# Patient Record
Sex: MALE | Race: AMERICAN INDIAN OR ALASKA NATIVE | NOT HISPANIC OR LATINO
[De-identification: names, ages, dates, MRNs, and addresses within clinical notes are randomized per-mention and may not be internally consistent; named-entity substitution may affect disease eponyms.]

---

## 2022-01-22 ENCOUNTER — TRANSCRIPTION ENCOUNTER (OUTPATIENT)
Age: 52
End: 2022-01-22

## 2022-01-22 ENCOUNTER — INPATIENT (INPATIENT)
Facility: HOSPITAL | Age: 52
LOS: 5 days | Discharge: HOME | End: 2022-01-28
Attending: SURGERY | Admitting: SURGERY
Payer: MEDICAID

## 2022-01-22 VITALS
SYSTOLIC BLOOD PRESSURE: 120 MMHG | RESPIRATION RATE: 20 BRPM | DIASTOLIC BLOOD PRESSURE: 56 MMHG | OXYGEN SATURATION: 99 % | HEART RATE: 65 BPM | TEMPERATURE: 97 F

## 2022-01-22 LAB
ALBUMIN SERPL ELPH-MCNC: 4 G/DL — SIGNIFICANT CHANGE UP (ref 3.5–5.2)
ALP SERPL-CCNC: 226 U/L — HIGH (ref 30–115)
ALT FLD-CCNC: 671 U/L — HIGH (ref 0–41)
ANION GAP SERPL CALC-SCNC: 14 MMOL/L — SIGNIFICANT CHANGE UP (ref 7–14)
APPEARANCE UR: CLEAR — SIGNIFICANT CHANGE UP
APTT BLD: 28.2 SEC — SIGNIFICANT CHANGE UP (ref 27–39.2)
AST SERPL-CCNC: 482 U/L — HIGH (ref 0–41)
BACTERIA # UR AUTO: NEGATIVE — SIGNIFICANT CHANGE UP
BASE EXCESS BLDV CALC-SCNC: -0.9 MMOL/L — SIGNIFICANT CHANGE UP (ref -2–3)
BASE EXCESS BLDV CALC-SCNC: -2.5 MMOL/L — LOW (ref -2–3)
BASOPHILS # BLD AUTO: 0.01 K/UL — SIGNIFICANT CHANGE UP (ref 0–0.2)
BASOPHILS NFR BLD AUTO: 0.1 % — SIGNIFICANT CHANGE UP (ref 0–1)
BILIRUB SERPL-MCNC: 3.5 MG/DL — HIGH (ref 0.2–1.2)
BILIRUB UR-MCNC: ABNORMAL
BUN SERPL-MCNC: 13 MG/DL — SIGNIFICANT CHANGE UP (ref 10–20)
CA-I SERPL-SCNC: 1.03 MMOL/L — LOW (ref 1.15–1.33)
CA-I SERPL-SCNC: 1.15 MMOL/L — SIGNIFICANT CHANGE UP (ref 1.15–1.33)
CALCIUM SERPL-MCNC: 8.2 MG/DL — LOW (ref 8.5–10.1)
CHLORIDE SERPL-SCNC: 99 MMOL/L — SIGNIFICANT CHANGE UP (ref 98–110)
CO2 SERPL-SCNC: 20 MMOL/L — SIGNIFICANT CHANGE UP (ref 17–32)
COLOR SPEC: ABNORMAL
CREAT SERPL-MCNC: 0.9 MG/DL — SIGNIFICANT CHANGE UP (ref 0.7–1.5)
DIFF PNL FLD: ABNORMAL
EOSINOPHIL # BLD AUTO: 0.04 K/UL — SIGNIFICANT CHANGE UP (ref 0–0.7)
EOSINOPHIL NFR BLD AUTO: 0.6 % — SIGNIFICANT CHANGE UP (ref 0–8)
EPI CELLS # UR: 1 /HPF — SIGNIFICANT CHANGE UP (ref 0–5)
GAS PNL BLDV: 129 MMOL/L — LOW (ref 136–145)
GAS PNL BLDV: 130 MMOL/L — LOW (ref 136–145)
GAS PNL BLDV: SIGNIFICANT CHANGE UP
GAS PNL BLDV: SIGNIFICANT CHANGE UP
GLUCOSE SERPL-MCNC: 211 MG/DL — HIGH (ref 70–99)
GLUCOSE UR QL: ABNORMAL
HCO3 BLDV-SCNC: 22 MMOL/L — SIGNIFICANT CHANGE UP (ref 22–29)
HCO3 BLDV-SCNC: 28 MMOL/L — SIGNIFICANT CHANGE UP (ref 22–29)
HCT VFR BLD CALC: 44.7 % — SIGNIFICANT CHANGE UP (ref 42–52)
HCT VFR BLDA CALC: 35 % — LOW (ref 39–51)
HCT VFR BLDA CALC: 45 % — SIGNIFICANT CHANGE UP (ref 39–51)
HGB BLD CALC-MCNC: 11.7 G/DL — LOW (ref 12.6–17.4)
HGB BLD CALC-MCNC: 15.1 G/DL — SIGNIFICANT CHANGE UP (ref 12.6–17.4)
HGB BLD-MCNC: 14.8 G/DL — SIGNIFICANT CHANGE UP (ref 14–18)
HYALINE CASTS # UR AUTO: 1 /LPF — SIGNIFICANT CHANGE UP (ref 0–7)
IMM GRANULOCYTES NFR BLD AUTO: 0.4 % — HIGH (ref 0.1–0.3)
INR BLD: 1.04 RATIO — SIGNIFICANT CHANGE UP (ref 0.65–1.3)
KETONES UR-MCNC: ABNORMAL
LACTATE BLDV-MCNC: 1.3 MMOL/L — SIGNIFICANT CHANGE UP (ref 0.5–2)
LACTATE BLDV-MCNC: 5.5 MMOL/L — CRITICAL HIGH (ref 0.5–2)
LEUKOCYTE ESTERASE UR-ACNC: NEGATIVE — SIGNIFICANT CHANGE UP
LYMPHOCYTES # BLD AUTO: 0.86 K/UL — LOW (ref 1.2–3.4)
LYMPHOCYTES # BLD AUTO: 12.8 % — LOW (ref 20.5–51.1)
MCHC RBC-ENTMCNC: 30.3 PG — SIGNIFICANT CHANGE UP (ref 27–31)
MCHC RBC-ENTMCNC: 33.1 G/DL — SIGNIFICANT CHANGE UP (ref 32–37)
MCV RBC AUTO: 91.4 FL — SIGNIFICANT CHANGE UP (ref 80–94)
MONOCYTES # BLD AUTO: 0.36 K/UL — SIGNIFICANT CHANGE UP (ref 0.1–0.6)
MONOCYTES NFR BLD AUTO: 5.4 % — SIGNIFICANT CHANGE UP (ref 1.7–9.3)
NEUTROPHILS # BLD AUTO: 5.41 K/UL — SIGNIFICANT CHANGE UP (ref 1.4–6.5)
NEUTROPHILS NFR BLD AUTO: 80.7 % — HIGH (ref 42.2–75.2)
NITRITE UR-MCNC: NEGATIVE — SIGNIFICANT CHANGE UP
NRBC # BLD: 0 /100 WBCS — SIGNIFICANT CHANGE UP (ref 0–0)
NT-PROBNP SERPL-SCNC: 291 PG/ML — SIGNIFICANT CHANGE UP (ref 0–300)
PCO2 BLDV: 34 MMHG — LOW (ref 42–55)
PCO2 BLDV: 64 MMHG — HIGH (ref 42–55)
PH BLDV: 7.25 — LOW (ref 7.32–7.43)
PH BLDV: 7.41 — SIGNIFICANT CHANGE UP (ref 7.32–7.43)
PH UR: 7.5 — SIGNIFICANT CHANGE UP (ref 5–8)
PLATELET # BLD AUTO: 170 K/UL — SIGNIFICANT CHANGE UP (ref 130–400)
PO2 BLDV: 17 MMHG — SIGNIFICANT CHANGE UP
PO2 BLDV: 74 MMHG — SIGNIFICANT CHANGE UP
POTASSIUM BLDV-SCNC: 3.1 MMOL/L — LOW (ref 3.5–5.1)
POTASSIUM BLDV-SCNC: 6.8 MMOL/L — CRITICAL HIGH (ref 3.5–5.1)
POTASSIUM SERPL-MCNC: 4.6 MMOL/L — SIGNIFICANT CHANGE UP (ref 3.5–5)
POTASSIUM SERPL-SCNC: 4.6 MMOL/L — SIGNIFICANT CHANGE UP (ref 3.5–5)
PROT SERPL-MCNC: 6.6 G/DL — SIGNIFICANT CHANGE UP (ref 6–8)
PROT UR-MCNC: ABNORMAL
PROTHROM AB SERPL-ACNC: 12 SEC — SIGNIFICANT CHANGE UP (ref 9.95–12.87)
RAPID RVP RESULT: SIGNIFICANT CHANGE UP
RBC # BLD: 4.89 M/UL — SIGNIFICANT CHANGE UP (ref 4.7–6.1)
RBC # FLD: 13.2 % — SIGNIFICANT CHANGE UP (ref 11.5–14.5)
RBC CASTS # UR COMP ASSIST: 1 /HPF — SIGNIFICANT CHANGE UP (ref 0–4)
SAO2 % BLDV: 26 % — SIGNIFICANT CHANGE UP
SAO2 % BLDV: 95.5 % — SIGNIFICANT CHANGE UP
SARS-COV-2 RNA SPEC QL NAA+PROBE: SIGNIFICANT CHANGE UP
SODIUM SERPL-SCNC: 133 MMOL/L — LOW (ref 135–146)
SP GR SPEC: >1.05 (ref 1.01–1.03)
TROPONIN T SERPL-MCNC: <0.01 NG/ML — SIGNIFICANT CHANGE UP
UROBILINOGEN FLD QL: ABNORMAL
WBC # BLD: 6.71 K/UL — SIGNIFICANT CHANGE UP (ref 4.8–10.8)
WBC # FLD AUTO: 6.71 K/UL — SIGNIFICANT CHANGE UP (ref 4.8–10.8)
WBC UR QL: 6 /HPF — HIGH (ref 0–5)

## 2022-01-22 PROCEDURE — 71045 X-RAY EXAM CHEST 1 VIEW: CPT | Mod: 26

## 2022-01-22 PROCEDURE — 74177 CT ABD & PELVIS W/CONTRAST: CPT | Mod: 26,MA

## 2022-01-22 PROCEDURE — 93010 ELECTROCARDIOGRAM REPORT: CPT

## 2022-01-22 PROCEDURE — 99291 CRITICAL CARE FIRST HOUR: CPT

## 2022-01-22 RX ORDER — KETOROLAC TROMETHAMINE 30 MG/ML
15 SYRINGE (ML) INJECTION ONCE
Refills: 0 | Status: DISCONTINUED | OUTPATIENT
Start: 2022-01-22 | End: 2022-01-22

## 2022-01-22 RX ORDER — MORPHINE SULFATE 50 MG/1
2 CAPSULE, EXTENDED RELEASE ORAL EVERY 6 HOURS
Refills: 0 | Status: DISCONTINUED | OUTPATIENT
Start: 2022-01-22 | End: 2022-01-27

## 2022-01-22 RX ORDER — DEXTROSE 50 % IN WATER 50 %
15 SYRINGE (ML) INTRAVENOUS ONCE
Refills: 0 | Status: DISCONTINUED | OUTPATIENT
Start: 2022-01-22 | End: 2022-01-27

## 2022-01-22 RX ORDER — INSULIN GLARGINE 100 [IU]/ML
10 INJECTION, SOLUTION SUBCUTANEOUS AT BEDTIME
Refills: 0 | Status: DISCONTINUED | OUTPATIENT
Start: 2022-01-22 | End: 2022-01-22

## 2022-01-22 RX ORDER — PIPERACILLIN AND TAZOBACTAM 4; .5 G/20ML; G/20ML
3.38 INJECTION, POWDER, LYOPHILIZED, FOR SOLUTION INTRAVENOUS EVERY 8 HOURS
Refills: 0 | Status: DISCONTINUED | OUTPATIENT
Start: 2022-01-22 | End: 2022-01-23

## 2022-01-22 RX ORDER — PIPERACILLIN AND TAZOBACTAM 4; .5 G/20ML; G/20ML
3.38 INJECTION, POWDER, LYOPHILIZED, FOR SOLUTION INTRAVENOUS ONCE
Refills: 0 | Status: COMPLETED | OUTPATIENT
Start: 2022-01-22 | End: 2022-01-22

## 2022-01-22 RX ORDER — ENOXAPARIN SODIUM 100 MG/ML
40 INJECTION SUBCUTANEOUS AT BEDTIME
Refills: 0 | Status: DISCONTINUED | OUTPATIENT
Start: 2022-01-22 | End: 2022-01-27

## 2022-01-22 RX ORDER — DEXTROSE 50 % IN WATER 50 %
12.5 SYRINGE (ML) INTRAVENOUS ONCE
Refills: 0 | Status: DISCONTINUED | OUTPATIENT
Start: 2022-01-22 | End: 2022-01-27

## 2022-01-22 RX ORDER — SODIUM CHLORIDE 9 MG/ML
1000 INJECTION, SOLUTION INTRAVENOUS
Refills: 0 | Status: DISCONTINUED | OUTPATIENT
Start: 2022-01-22 | End: 2022-01-27

## 2022-01-22 RX ORDER — DEXTROSE 50 % IN WATER 50 %
25 SYRINGE (ML) INTRAVENOUS ONCE
Refills: 0 | Status: DISCONTINUED | OUTPATIENT
Start: 2022-01-22 | End: 2022-01-27

## 2022-01-22 RX ORDER — ACETAMINOPHEN 500 MG
650 TABLET ORAL ONCE
Refills: 0 | Status: COMPLETED | OUTPATIENT
Start: 2022-01-22 | End: 2022-01-22

## 2022-01-22 RX ORDER — SODIUM CHLORIDE 9 MG/ML
1000 INJECTION, SOLUTION INTRAVENOUS ONCE
Refills: 0 | Status: COMPLETED | OUTPATIENT
Start: 2022-01-22 | End: 2022-01-22

## 2022-01-22 RX ORDER — PANTOPRAZOLE SODIUM 20 MG/1
40 TABLET, DELAYED RELEASE ORAL
Refills: 0 | Status: DISCONTINUED | OUTPATIENT
Start: 2022-01-22 | End: 2022-01-27

## 2022-01-22 RX ORDER — ONDANSETRON 8 MG/1
4 TABLET, FILM COATED ORAL EVERY 8 HOURS
Refills: 0 | Status: DISCONTINUED | OUTPATIENT
Start: 2022-01-22 | End: 2022-01-27

## 2022-01-22 RX ORDER — CHLORHEXIDINE GLUCONATE 213 G/1000ML
1 SOLUTION TOPICAL EVERY 24 HOURS
Refills: 0 | Status: DISCONTINUED | OUTPATIENT
Start: 2022-01-22 | End: 2022-01-27

## 2022-01-22 RX ORDER — SODIUM CHLORIDE 9 MG/ML
2000 INJECTION, SOLUTION INTRAVENOUS ONCE
Refills: 0 | Status: COMPLETED | OUTPATIENT
Start: 2022-01-22 | End: 2022-01-22

## 2022-01-22 RX ORDER — METRONIDAZOLE 500 MG
500 TABLET ORAL ONCE
Refills: 0 | Status: COMPLETED | OUTPATIENT
Start: 2022-01-22 | End: 2022-01-22

## 2022-01-22 RX ORDER — GLUCAGON INJECTION, SOLUTION 0.5 MG/.1ML
1 INJECTION, SOLUTION SUBCUTANEOUS ONCE
Refills: 0 | Status: DISCONTINUED | OUTPATIENT
Start: 2022-01-22 | End: 2022-01-27

## 2022-01-22 RX ORDER — CEFEPIME 1 G/1
2000 INJECTION, POWDER, FOR SOLUTION INTRAMUSCULAR; INTRAVENOUS ONCE
Refills: 0 | Status: COMPLETED | OUTPATIENT
Start: 2022-01-22 | End: 2022-01-22

## 2022-01-22 RX ADMIN — SODIUM CHLORIDE 2000 MILLILITER(S): 9 INJECTION, SOLUTION INTRAVENOUS at 19:27

## 2022-01-22 RX ADMIN — Medication 650 MILLIGRAM(S): at 19:27

## 2022-01-22 RX ADMIN — PIPERACILLIN AND TAZOBACTAM 200 GRAM(S): 4; .5 INJECTION, POWDER, LYOPHILIZED, FOR SOLUTION INTRAVENOUS at 23:46

## 2022-01-22 RX ADMIN — Medication 15 MILLIGRAM(S): at 22:54

## 2022-01-22 RX ADMIN — CEFEPIME 100 MILLIGRAM(S): 1 INJECTION, POWDER, FOR SOLUTION INTRAMUSCULAR; INTRAVENOUS at 22:15

## 2022-01-22 RX ADMIN — Medication 100 MILLIGRAM(S): at 21:46

## 2022-01-22 RX ADMIN — SODIUM CHLORIDE 100 MILLILITER(S): 9 INJECTION, SOLUTION INTRAVENOUS at 23:26

## 2022-01-22 RX ADMIN — SODIUM CHLORIDE 1000 MILLILITER(S): 9 INJECTION, SOLUTION INTRAVENOUS at 21:46

## 2022-01-22 NOTE — ED PROVIDER NOTE - NS ED ROS FT
Constitutional: (+) fever (-) malaise (-) diaphoresis (-) chills (-) wt. loss (-) body aches (-) night sweats  Eyes: (-) visual changes (-) eye pain (-) eye discharge (-) photophobia (-) FB sensation  ENMT: (-) nasal or chest congestion (-) runny nose (-) sore throat (-) hoarseness  (-) hearing changes (-) ear pain (-) ear discharge or infections (-) neck pain (-) neck stiffness  Cardiac: (-) chest pain  (-) palpitations (-) syncope (-) edema  Respiratory: (+) cough (+) SOB (-) GAMBOA  GI: (-) nausea (-) vomiting (-) diarrhea (-) abdominal pain  Neuro: (-) headache (-) dizziness (-) numbness/tingling to extremities B/L (-) weakness  Skin: (-) rash (-) laceration    Except as documented in the HPI, all other systems are negative.

## 2022-01-22 NOTE — ED PROVIDER NOTE - CLINICAL SUMMARY MEDICAL DECISION MAKING FREE TEXT BOX
sepsis - unclear etiology - transaminitis and jaundice noted - neg CT - admitted for further treatment - possible drug-induced hepatitis 2/2 testosterone supplements

## 2022-01-22 NOTE — H&P ADULT - ATTENDING COMMENTS
51 yr old male presented with c/o abdominal pain.  VITAL SIGNS (Last 24 hrs):  T(C): 35.6 (01-23-22 @ 13:12), Max: 39.2 (01-22-22 @ 19:02)  HR: 53 (01-23-22 @ 13:12) (53 - 149)  BP: 113/70 (01-23-22 @ 13:12) (93/57 - 120/56)  RR: 18 (01-23-22 @ 13:12) (18 - 20)  SpO2: 96% (01-23-22 @ 07:55) (96% - 99%)  Wt(kg): --  Daily     Daily     I&O's Summary    22 Jan 2022 07:01  -  23 Jan 2022 07:00  --------------------------------------------------------  IN: 750 mL / OUT: 800 mL / NET: -50 mL                          14.8   6.71  )-----------( 170      ( 22 Jan 2022 19:01 )             44.7   01-23    137  |  106  |  13  ----------------------------<  203<H>  3.5   |  20  |  0.7    Ca    7.7<L>      23 Jan 2022 04:30  Phos  2.1     01-23  Mg     2.3     01-23    TPro  5.1<L>  /  Alb  3.0<L>  /  TBili  2.4<H>  /  DBili  x   /  AST  277<H>  /  ALT  441<H>  /  AlkPhos  165<H>  01-23  ekg- rate 149/min RBBB  o/e  aaox3, sclera not yellow  chest--b/l air entry  [id number]  abd/gi--soft, bs+  no edema  assessment and plan:  # Ruq pain sec to cholelithiasis but no sonographic evidence of ac cholecystitis-- will get HIDA scan in AM-- GI eval is pending and patient started on diet  # transaminits is improving-- direct bilirubin is trending down  #hx of diabetes-- patient is controlled only on diet-- will check hba1c and finger stick monitoring. urine noted for glycosuria.  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

## 2022-01-22 NOTE — ED PROVIDER NOTE - ATTENDING CONTRIBUTION TO CARE
I am unable to obtain a comprehensive history, review of systems, past medical history, and/or physical exam due to constraints imposed by the urgency of the patient's clinical condition and/or mental status.     Patient is a 51-year-old male who was upgraded from urgent care to critical care for developing rigors.    Exam: tachycardia, thready pulses, no LE edema, no calf tenderness, CTAB, soft NT abdomen, lacy reticular rash  Plan: labs, cx, ua, xr, ivf, abx I am unable to obtain a comprehensive history, review of systems, past medical history, and/or physical exam due to constraints imposed by the urgency of the patient's clinical condition and/or mental status.     Patient is a 51-year-old male who was upgraded from urgent care to critical care for developing rigors, fever, tachycardia, and tachypnea - consistent with sepsis.  No hypotension.    Exam: tachycardia, thready pulses, no LE edema, no calf tenderness, CTAB, soft NT abdomen, lacy reticular rash  Plan: labs, cx, ua, xr, ivf, abx

## 2022-01-22 NOTE — ED ADULT NURSE NOTE - NSIMPLEMENTINTERV_GEN_ALL_ED
Implemented All Fall Risk Interventions:  [place] to call system. Call bell, personal items and telephone within reach. Instruct patient to call for assistance. Room bathroom lighting operational. Non-slip footwear when patient is off stretcher. Physically safe environment: no spills, clutter or unnecessary equipment. Stretcher in lowest position, wheels locked, appropriate side rails in place. Provide visual cue, wrist band, yellow gown, etc. Monitor gait and stability. Monitor for mental status changes and reorient to person, place, and time. Review medications for side effects contributing to fall risk. Reinforce activity limits and safety measures with patient and family.

## 2022-01-22 NOTE — H&P ADULT - REASON FOR ADMISSION
Keep wound clean and dry.    May shower tomorrow, avoid immersive baths.    May resume taking Ibuprofen on 12/21/18.    Do not drive while taking narcotic pain medication.    May alternate Norco with Tylenol, but do not take more than 4 grams of Tylenol in 24 hours.    Avoid tension to suture line such as pulling or stretching.    Wear surgical bra for 2-3 days, adjust if discomfort.    - Call Physician for signs of wound infection:  (1)  Fever greater than 101 degrees F  (2)  Bleeding  (3)  Separation of incision  (4)  Pus like drainage  (5)  Excessive swelling  - For difficulty breathing, vomiting, inability to tolerate oral intake   - For any pain that is not controlled by pain medication or anything worrisome   -Avoid driving while taking pain medications or experiencing pain   -Contact physician's office for post-operative appointment     Patient/Family given For Your Well-Being Teaching Sheet:     Care After Anesthesia/Sedation __xx____   Oral Contraceptive Pills may be ineffective for the next 8 days following anesthesia due to interactions with medications you may have received    Pain Management Tips              __xx____  About Surgical Site Infections  ___xx___  Smoking and Second hand Smoke Information for Everyone xx_____  Same Day Surgery  Card           __xx____    Supplies:        
abdominal pain  fever

## 2022-01-22 NOTE — H&P ADULT - HISTORY OF PRESENT ILLNESS
50 yo male, h/o pre diabetes, not on home medication presented for fever and abdominal pain  Patient started taking 2 testosterone pills per day since last week; Since then he is been having RU and HUSEYIN quadrant pain, away from meals, severe , non radiating, associated with SOB, nausea, headache. Pain is stabbing, relieved by Tylenol, associated with one episode of loose stools, and fever with chills that started 3 days ago  ROS otherwise negative     temp 102.5 108/61 mmHg room air

## 2022-01-22 NOTE — H&P ADULT - ASSESSMENT
50 yo male, h/o pre diabetes, not on home medication presented for fever and abdominal pain    # Suspected Cholangitis   # Sepsis  # SIRS on admission  # Abdominal Pain     - RUQ tenderness on exam   - SIRS  temp 102.5 F; WBC WNL lactate 5.5 on admission   - Total [place] 3.5 Alk Phos 226    - s.p 3 L LR and cefepime  - start LR at 100 cc/hr  - start Zosyn 3.25 q 4 hrs  - CT AP negative for abdominal pathology  - check Liver US  - GI on board for possible ERCP  - Consider Surgery Consult if cholelithiasis for GB removal  - follow up Blood culture; lactate ; daily LFTs  - patient was taking testosterone pills the last week; not sure if it triggers biliary cholic ; no ETOH use, follow up lipid panel     # Pre diabetes    - check A1c  - start basal insulin if FS persistently > 180   - counseled about weight loss, exercise, healthy diet , consistent carb diet    # DVT ppx Lovenox  # GI ppx protonix  # NPO for now  # Full Code

## 2022-01-22 NOTE — ED PROVIDER NOTE - OBJECTIVE STATEMENT
50 yo M no pmhx presenting to the ED for evaluation of sharp, constant, worsening, b/l pleuritic chest pain, fever, sob, and cough x 3 days. Pt admits to recently taking testosterone supplements. Denies any alleviating factors for symptoms. denies LE swelling, calf pain, nausea, vomiting, abd pain, LE swelling, calf pain.

## 2022-01-22 NOTE — H&P ADULT - NSHPPHYSICALEXAM_GEN_ALL_CORE
PHYSICAL EXAM:      Constitutional: NAD    Eye slightly icteric     Respiratory: clear to auscultation b/l     Cardiovascular: regular rate and rhythm no audible murmur     Gastrointestinal: soft non tender, positive bowel sounds no organomegaly     Extremities: no LE edema     Neurological: alert and oriented x 3 grossly intact non focal     Skin: pale

## 2022-01-22 NOTE — PATIENT PROFILE ADULT - FALL HARM RISK - UNIVERSAL INTERVENTIONS
Bed in lowest position, wheels locked, appropriate side rails in place/Call bell, personal items and telephone in reach/Instruct patient to call for assistance before getting out of bed or chair/Non-slip footwear when patient is out of bed/[place] to call system/Physically safe environment - no spills, clutter or unnecessary equipment/Purposeful Proactive Rounding/Room/bathroom lighting operational, light cord in reach

## 2022-01-22 NOTE — ED PROVIDER NOTE - PROGRESS NOTE DETAILS
NC: upon initial evaluation of pt, tachycardic to 150 bpm, tachypneic, pt moved to crit at this time.

## 2022-01-22 NOTE — ED PROVIDER NOTE - PHYSICAL EXAMINATION
GENERAL: Well-nourished, Well-developed. NAD.  HEAD: No visible or palpable bumps or hematomas. No ecchymosis behind ears B/L.  Eyes: PERRLA, EOMI. No asymmetry. No nystagmus. No conjunctival injection. Non-icteric sclera.  ENMT: MMM.   Neck: Supple. FROM  CVS: (+)tachycardic. Normal S1,S2. No murmurs appreciated on auscultation   RESP: (+)tachypneic. Lungs clear to auscultation B/L. No wheezing, rales, or rhonchi auscultated.  GI: Normal auscultation of bowel sounds in all 4 quadrants. Soft, Nontender, Nondistended. No guarding or rebound tenderness. No CVAT B/L.  Skin: Warm, Dry. No rashes or lesions. Good cap refill < 2 sec B/L.  EXT: Radial and pedal pulses present B/L. No calf tenderness or swelling B/L. No palpable cords. No pedal edema B/L.

## 2022-01-23 LAB
ALBUMIN SERPL ELPH-MCNC: 3 G/DL — LOW (ref 3.5–5.2)
ALP SERPL-CCNC: 165 U/L — HIGH (ref 30–115)
ALT FLD-CCNC: 441 U/L — HIGH (ref 0–41)
ANION GAP SERPL CALC-SCNC: 11 MMOL/L — SIGNIFICANT CHANGE UP (ref 7–14)
APTT BLD: 31.2 SEC — SIGNIFICANT CHANGE UP (ref 27–39.2)
AST SERPL-CCNC: 277 U/L — HIGH (ref 0–41)
BILIRUB SERPL-MCNC: 2.4 MG/DL — HIGH (ref 0.2–1.2)
BUN SERPL-MCNC: 13 MG/DL — SIGNIFICANT CHANGE UP (ref 10–20)
CALCIUM SERPL-MCNC: 7.7 MG/DL — LOW (ref 8.5–10.1)
CHLORIDE SERPL-SCNC: 106 MMOL/L — SIGNIFICANT CHANGE UP (ref 98–110)
CHOLEST SERPL-MCNC: 137 MG/DL — SIGNIFICANT CHANGE UP
CO2 SERPL-SCNC: 20 MMOL/L — SIGNIFICANT CHANGE UP (ref 17–32)
CREAT SERPL-MCNC: 0.7 MG/DL — SIGNIFICANT CHANGE UP (ref 0.7–1.5)
GLUCOSE BLDC GLUCOMTR-MCNC: 132 MG/DL — HIGH (ref 70–99)
GLUCOSE BLDC GLUCOMTR-MCNC: 149 MG/DL — HIGH (ref 70–99)
GLUCOSE BLDC GLUCOMTR-MCNC: 155 MG/DL — HIGH (ref 70–99)
GLUCOSE BLDC GLUCOMTR-MCNC: 199 MG/DL — HIGH (ref 70–99)
GLUCOSE BLDC GLUCOMTR-MCNC: 204 MG/DL — HIGH (ref 70–99)
GLUCOSE SERPL-MCNC: 203 MG/DL — HIGH (ref 70–99)
HDLC SERPL-MCNC: 43 MG/DL — SIGNIFICANT CHANGE UP
INR BLD: 1.01 RATIO — SIGNIFICANT CHANGE UP (ref 0.65–1.3)
LACTATE SERPL-SCNC: 0.9 MMOL/L — SIGNIFICANT CHANGE UP (ref 0.7–2)
LDH SERPL L TO P-CCNC: 275 U/L — HIGH (ref 50–242)
LIPID PNL WITH DIRECT LDL SERPL: 64 MG/DL — SIGNIFICANT CHANGE UP
MAGNESIUM SERPL-MCNC: 2.3 MG/DL — SIGNIFICANT CHANGE UP (ref 1.8–2.4)
NON HDL CHOLESTEROL: 94 MG/DL — SIGNIFICANT CHANGE UP
PHOSPHATE SERPL-MCNC: 2.1 MG/DL — SIGNIFICANT CHANGE UP (ref 2.1–4.9)
POTASSIUM SERPL-MCNC: 3.5 MMOL/L — SIGNIFICANT CHANGE UP (ref 3.5–5)
POTASSIUM SERPL-SCNC: 3.5 MMOL/L — SIGNIFICANT CHANGE UP (ref 3.5–5)
PROT SERPL-MCNC: 5.1 G/DL — LOW (ref 6–8)
PROTHROM AB SERPL-ACNC: 11.6 SEC — SIGNIFICANT CHANGE UP (ref 9.95–12.87)
SODIUM SERPL-SCNC: 137 MMOL/L — SIGNIFICANT CHANGE UP (ref 135–146)
TRIGL SERPL-MCNC: 121 MG/DL — SIGNIFICANT CHANGE UP

## 2022-01-23 PROCEDURE — 76705 ECHO EXAM OF ABDOMEN: CPT | Mod: 26

## 2022-01-23 PROCEDURE — 99223 1ST HOSP IP/OBS HIGH 75: CPT

## 2022-01-23 RX ORDER — METRONIDAZOLE 500 MG
500 TABLET ORAL EVERY 8 HOURS
Refills: 0 | Status: DISCONTINUED | OUTPATIENT
Start: 2022-01-23 | End: 2022-01-24

## 2022-01-23 RX ORDER — ACETAMINOPHEN 500 MG
650 TABLET ORAL EVERY 6 HOURS
Refills: 0 | Status: DISCONTINUED | OUTPATIENT
Start: 2022-01-23 | End: 2022-01-23

## 2022-01-23 RX ORDER — CEFTRIAXONE 500 MG/1
2000 INJECTION, POWDER, FOR SOLUTION INTRAMUSCULAR; INTRAVENOUS EVERY 24 HOURS
Refills: 0 | Status: DISCONTINUED | OUTPATIENT
Start: 2022-01-23 | End: 2022-01-27

## 2022-01-23 RX ADMIN — Medication 500 MILLIGRAM(S): at 06:03

## 2022-01-23 RX ADMIN — SODIUM CHLORIDE 100 MILLILITER(S): 9 INJECTION, SOLUTION INTRAVENOUS at 08:50

## 2022-01-23 RX ADMIN — Medication 650 MILLIGRAM(S): at 01:33

## 2022-01-23 RX ADMIN — CEFTRIAXONE 100 MILLIGRAM(S): 500 INJECTION, POWDER, FOR SOLUTION INTRAMUSCULAR; INTRAVENOUS at 06:25

## 2022-01-23 RX ADMIN — CHLORHEXIDINE GLUCONATE 1 APPLICATION(S): 213 SOLUTION TOPICAL at 06:00

## 2022-01-23 RX ADMIN — SODIUM CHLORIDE 100 MILLILITER(S): 9 INJECTION, SOLUTION INTRAVENOUS at 18:33

## 2022-01-23 RX ADMIN — Medication 500 MILLIGRAM(S): at 13:01

## 2022-01-23 RX ADMIN — ENOXAPARIN SODIUM 40 MILLIGRAM(S): 100 INJECTION SUBCUTANEOUS at 21:08

## 2022-01-23 RX ADMIN — Medication 650 MILLIGRAM(S): at 02:30

## 2022-01-23 RX ADMIN — PANTOPRAZOLE SODIUM 40 MILLIGRAM(S): 20 TABLET, DELAYED RELEASE ORAL at 06:01

## 2022-01-23 RX ADMIN — Medication 500 MILLIGRAM(S): at 21:08

## 2022-01-23 NOTE — CONSULT NOTE ADULT - ASSESSMENT
52 yo male, h/o pre diabetes, not on home medication presented for fever and abdominal pain      # Fever + jaundice concern for cholangitis vs DILI ( testosterone ) vs viral hepatitis  - Afebrile today  - no leukocytosis  - anabolic androgen can cause cholestatic liver injury  - No herbal supplement  - CT/US: cholelithiasis w/o any biliary duct dilatation    Rec:  - c/w IV abx  - Monitor LFTs with fractionated bilirubin daily  - MRCP  - HAV IgM/IgG, HBsAg, HBsAb, HBcAb IgM/IgG, HCV Ab, Anti HEV, Serum Ferritin, iron studies, Ceruloplasmin level, ROXY, SMA, immunoglobolins, AMA.       50 yo male, h/o pre diabetes, not on home medication presented for fever and abdominal pain      # Patient with Fever + jaundice, R/o  cholangitis vs DILI ( testosterone ) vs viral hepatitis  - Afebrile today  - no leukocytosis  - anabolic androgen can cause cholestatic liver injury  - No herbal supplement  - CT/US: cholelithiasis w/o any biliary duct dilatation    Rec:  - c/w IV abx  - Monitor LFTs with fractionated bilirubin daily  - MRCP  - HAV IgM/IgG, HBsAg, HBsAb, HBcAb IgM/IgG, HCV Ab, Anti HEV, Serum Ferritin, iron studies, Ceruloplasmin level, ROXY, SMA, immunoglobolins, AMA.

## 2022-01-23 NOTE — CONSULT NOTE ADULT - ASSESSMENT
50 yo male, h/o pre diabetes, not on home medication presented for fever and abdominal pain  Patient started taking 2 testosterone pills per day since last week; Since then he is been having RU and HUSEYIN quadrant pain, away from meals, severe , non radiating, associated with SOB, nausea, headache. Pain is stabbing, relieved by Tylenol, associated with one episode of loose stools, and fever with chills that started 3 days ago   temp 102.5, BP  108/61     IMPRESSION;  Sepsis secondary to acute cholangitis with peritonitis    RECOMMENDATIONS;  BCx  GI evaluation> possible MRCP/ERCP  Rocephin 2 gm iv q24h  Flagyl 500 mg iv q8h

## 2022-01-24 LAB
A1C WITH ESTIMATED AVERAGE GLUCOSE RESULT: 7 % — HIGH (ref 4–5.6)
ALBUMIN SERPL ELPH-MCNC: 3 G/DL — LOW (ref 3.5–5.2)
ALBUMIN SERPL ELPH-MCNC: 3.1 G/DL — LOW (ref 3.5–5.2)
ALP SERPL-CCNC: 147 U/L — HIGH (ref 30–115)
ALP SERPL-CCNC: 154 U/L — HIGH (ref 30–115)
ALT FLD-CCNC: 331 U/L — HIGH (ref 0–41)
ALT FLD-CCNC: 339 U/L — HIGH (ref 0–41)
ANION GAP SERPL CALC-SCNC: 11 MMOL/L — SIGNIFICANT CHANGE UP (ref 7–14)
ANION GAP SERPL CALC-SCNC: 13 MMOL/L — SIGNIFICANT CHANGE UP (ref 7–14)
APTT BLD: 37.8 SEC — SIGNIFICANT CHANGE UP (ref 27–39.2)
AST SERPL-CCNC: 165 U/L — HIGH (ref 0–41)
AST SERPL-CCNC: 166 U/L — HIGH (ref 0–41)
BASOPHILS # BLD AUTO: 0.01 K/UL — SIGNIFICANT CHANGE UP (ref 0–0.2)
BASOPHILS NFR BLD AUTO: 0.2 % — SIGNIFICANT CHANGE UP (ref 0–1)
BILIRUB DIRECT SERPL-MCNC: 0.6 MG/DL — HIGH (ref 0–0.3)
BILIRUB INDIRECT FLD-MCNC: 0.4 MG/DL — SIGNIFICANT CHANGE UP (ref 0.2–1.2)
BILIRUB SERPL-MCNC: 0.9 MG/DL — SIGNIFICANT CHANGE UP (ref 0.2–1.2)
BILIRUB SERPL-MCNC: 1 MG/DL — SIGNIFICANT CHANGE UP (ref 0.2–1.2)
BUN SERPL-MCNC: 10 MG/DL — SIGNIFICANT CHANGE UP (ref 10–20)
BUN SERPL-MCNC: 10 MG/DL — SIGNIFICANT CHANGE UP (ref 10–20)
CALCIUM SERPL-MCNC: 7.5 MG/DL — LOW (ref 8.5–10.1)
CALCIUM SERPL-MCNC: 7.8 MG/DL — LOW (ref 8.5–10.1)
CHLORIDE SERPL-SCNC: 102 MMOL/L — SIGNIFICANT CHANGE UP (ref 98–110)
CHLORIDE SERPL-SCNC: 103 MMOL/L — SIGNIFICANT CHANGE UP (ref 98–110)
CO2 SERPL-SCNC: 17 MMOL/L — SIGNIFICANT CHANGE UP (ref 17–32)
CO2 SERPL-SCNC: 18 MMOL/L — SIGNIFICANT CHANGE UP (ref 17–32)
CREAT SERPL-MCNC: 0.6 MG/DL — LOW (ref 0.7–1.5)
CREAT SERPL-MCNC: 0.6 MG/DL — LOW (ref 0.7–1.5)
EOSINOPHIL # BLD AUTO: 0.18 K/UL — SIGNIFICANT CHANGE UP (ref 0–0.7)
EOSINOPHIL NFR BLD AUTO: 3.5 % — SIGNIFICANT CHANGE UP (ref 0–8)
ESTIMATED AVERAGE GLUCOSE: 154 MG/DL — HIGH (ref 68–114)
GGT SERPL-CCNC: 563 U/L — HIGH (ref 1–40)
GLUCOSE BLDC GLUCOMTR-MCNC: 109 MG/DL — HIGH (ref 70–99)
GLUCOSE BLDC GLUCOMTR-MCNC: 192 MG/DL — HIGH (ref 70–99)
GLUCOSE BLDC GLUCOMTR-MCNC: 226 MG/DL — HIGH (ref 70–99)
GLUCOSE SERPL-MCNC: 125 MG/DL — HIGH (ref 70–99)
GLUCOSE SERPL-MCNC: 128 MG/DL — HIGH (ref 70–99)
HAPTOGLOB SERPL-MCNC: 155 MG/DL — SIGNIFICANT CHANGE UP (ref 34–200)
HCT VFR BLD CALC: 35.2 % — LOW (ref 42–52)
HGB BLD-MCNC: 11.7 G/DL — LOW (ref 14–18)
IMM GRANULOCYTES NFR BLD AUTO: 0.4 % — HIGH (ref 0.1–0.3)
INR BLD: 1.01 RATIO — SIGNIFICANT CHANGE UP (ref 0.65–1.3)
LACTATE SERPL-SCNC: 0.8 MMOL/L — SIGNIFICANT CHANGE UP (ref 0.7–2)
LYMPHOCYTES # BLD AUTO: 1.03 K/UL — LOW (ref 1.2–3.4)
LYMPHOCYTES # BLD AUTO: 20.2 % — LOW (ref 20.5–51.1)
MAGNESIUM SERPL-MCNC: 1.8 MG/DL — SIGNIFICANT CHANGE UP (ref 1.8–2.4)
MCHC RBC-ENTMCNC: 30.5 PG — SIGNIFICANT CHANGE UP (ref 27–31)
MCHC RBC-ENTMCNC: 33.2 G/DL — SIGNIFICANT CHANGE UP (ref 32–37)
MCV RBC AUTO: 91.9 FL — SIGNIFICANT CHANGE UP (ref 80–94)
MONOCYTES # BLD AUTO: 0.48 K/UL — SIGNIFICANT CHANGE UP (ref 0.1–0.6)
MONOCYTES NFR BLD AUTO: 9.4 % — HIGH (ref 1.7–9.3)
NEUTROPHILS # BLD AUTO: 3.39 K/UL — SIGNIFICANT CHANGE UP (ref 1.4–6.5)
NEUTROPHILS NFR BLD AUTO: 66.3 % — SIGNIFICANT CHANGE UP (ref 42.2–75.2)
NRBC # BLD: 0 /100 WBCS — SIGNIFICANT CHANGE UP (ref 0–0)
PHOSPHATE SERPL-MCNC: 2.3 MG/DL — SIGNIFICANT CHANGE UP (ref 2.1–4.9)
PLATELET # BLD AUTO: 155 K/UL — SIGNIFICANT CHANGE UP (ref 130–400)
POTASSIUM SERPL-MCNC: 3.8 MMOL/L — SIGNIFICANT CHANGE UP (ref 3.5–5)
POTASSIUM SERPL-MCNC: 3.8 MMOL/L — SIGNIFICANT CHANGE UP (ref 3.5–5)
POTASSIUM SERPL-SCNC: 3.8 MMOL/L — SIGNIFICANT CHANGE UP (ref 3.5–5)
POTASSIUM SERPL-SCNC: 3.8 MMOL/L — SIGNIFICANT CHANGE UP (ref 3.5–5)
PROT SERPL-MCNC: 5 G/DL — LOW (ref 6–8)
PROT SERPL-MCNC: 5.2 G/DL — LOW (ref 6–8)
PROTHROM AB SERPL-ACNC: 11.6 SEC — SIGNIFICANT CHANGE UP (ref 9.95–12.87)
RBC # BLD: 3.83 M/UL — LOW (ref 4.7–6.1)
RBC # FLD: 13.5 % — SIGNIFICANT CHANGE UP (ref 11.5–14.5)
SODIUM SERPL-SCNC: 131 MMOL/L — LOW (ref 135–146)
SODIUM SERPL-SCNC: 133 MMOL/L — LOW (ref 135–146)
WBC # BLD: 5.11 K/UL — SIGNIFICANT CHANGE UP (ref 4.8–10.8)
WBC # FLD AUTO: 5.11 K/UL — SIGNIFICANT CHANGE UP (ref 4.8–10.8)

## 2022-01-24 PROCEDURE — 74181 MRI ABDOMEN W/O CONTRAST: CPT | Mod: 26

## 2022-01-24 PROCEDURE — 99233 SBSQ HOSP IP/OBS HIGH 50: CPT

## 2022-01-24 RX ORDER — METRONIDAZOLE 500 MG
500 TABLET ORAL EVERY 8 HOURS
Refills: 0 | Status: DISCONTINUED | OUTPATIENT
Start: 2022-01-24 | End: 2022-01-27

## 2022-01-24 RX ADMIN — Medication 100 MILLIGRAM(S): at 22:33

## 2022-01-24 RX ADMIN — Medication 500 MILLIGRAM(S): at 05:02

## 2022-01-24 RX ADMIN — PANTOPRAZOLE SODIUM 40 MILLIGRAM(S): 20 TABLET, DELAYED RELEASE ORAL at 05:03

## 2022-01-24 RX ADMIN — Medication 100 MILLIGRAM(S): at 14:04

## 2022-01-24 RX ADMIN — CEFTRIAXONE 100 MILLIGRAM(S): 500 INJECTION, POWDER, FOR SOLUTION INTRAMUSCULAR; INTRAVENOUS at 05:04

## 2022-01-24 RX ADMIN — MORPHINE SULFATE 2 MILLIGRAM(S): 50 CAPSULE, EXTENDED RELEASE ORAL at 04:45

## 2022-01-24 RX ADMIN — CHLORHEXIDINE GLUCONATE 1 APPLICATION(S): 213 SOLUTION TOPICAL at 05:02

## 2022-01-24 RX ADMIN — ENOXAPARIN SODIUM 40 MILLIGRAM(S): 100 INJECTION SUBCUTANEOUS at 22:33

## 2022-01-24 RX ADMIN — MORPHINE SULFATE 2 MILLIGRAM(S): 50 CAPSULE, EXTENDED RELEASE ORAL at 04:29

## 2022-01-24 NOTE — PROGRESS NOTE ADULT - ASSESSMENT
52 yo male, h/o pre diabetes, not on home medication presented for fever and abdominal pain  Patient started taking 2 testosterone pills per day since last week; Since then he is been having RU and HUSEYIN quadrant pain, away from meals, severe , non radiating, associated with SOB, nausea, headache. Pain is stabbing, relieved by Tylenol, associated with one episode of loose stools, and fever with chills that started 3 days ago  ROS otherwise negative    # RUQ pain sec to cholelithiasis but no sonographic evidence of ac cholecystitis-- MRI ordered- seen by GI hepatitis panel is in lab, patient tolerating diet  # transaminits is improving-- direct bilirubin is trending down  #hx of diabetes-- patient is controlled only on diet--  hba1c is 7.0 and will start metformin at discharge and finger stick monitoring. urine noted for glycosuria.  # patient took [facility] from [facility] to build body-- says he needs it as he works for [employer]. currently off it.      52 yo male, h/o pre diabetes, not on home medication presented for fever and abdominal pain  Patient started taking 2 testosterone pills per day since last week; Since then he is been having RU and HUSEYIN quadrant pain, away from meals, severe , non radiating, associated with SOB, nausea, headache. Pain is stabbing, relieved by Tylenol, associated with one episode of loose stools, and fever with chills that started 3 days ago  ROS otherwise negative    # RUQ pain sec to cholelithiasis but no sonographic evidence of ac cholecystitis-- MRI ordered- seen by GI hepatitis panel is in lab, patient tolerating diet  # transaminits is improving-- direct bilirubin is trending down  #hx of diabetes-- patient is controlled only on diet--  hba1c is 7.0 and will start metformin at discharge and finger stick monitoring. urine noted for glycosuria.  # patient took testosterone from [facility] to build body-- says he needs it as he works for [employer]. currently off it.     Patient wants to leave soon-- DC plan as soon as we have a diagnosis

## 2022-01-24 NOTE — PROGRESS NOTE ADULT - SUBJECTIVE AND OBJECTIVE BOX
Location: [address] ([address])  Patient Name: SHIV SIBLEY  Age: 51y  Gender: Male    #Patient is a 51y old Male who presents with a chief complaint of abdominal pain  fever (24 Jan 2022 08:25)      #ED/Hospital course:    - in ED tachycardic thready pulses, lacy reticular rash, vitals 149 bpm 102.5 F 108/61 on RA, labs: elevated LFTs, UA (+) for bilirubin ketones and proteins, imaging: CT A/P (-), U/S RUQ cholelithiasis and hepatic steatosis. Admitted to floor with GI consulted    #Admitted to floor for   - elevated LFTs, SIRS, suspected cholangitis and MRCP planned for 1/24    #Relevant Past Medical and Surgical History:   - prediabetes    #Standing chart primary diagnosis of   - SEPSIS; TRANSAMINITIS        Progress Note  Today is hospital day 2d.   This morning Mr. Sibley was seen and examined at bedside.  Nurse reports no overnight events. Patient reports no concerns overnight except for mild R shoulder pain, but demonstrating full ROM. No abdominal pain fevers or nausea. He does report intentionally vomiting (as he does every morning to wake himself up) and seeing yellow in the vomit which he thinks is his antibiotics. Patient sleeps, eats, drinks, uses the bathroom, and ambulates at his baseline.    Patient denies nausea or vomiting, abdominal pain, diarrhea, or constipation, dysuria, urgency, frequency, dribbling, blood in urine or stool, changes in stool caliber, color, or quality. Patient denies any shortness of breath, chest pain, palpitations, or light headedness at rest or with attempted ambulation.    Vital Signs in the last 24 hours   Vitals Summary T(C): 36.3 (01-23-22 @ 20:41), Max: 36.3 (01-23-22 @ 20:41)  HR: 61 (01-23-22 @ 20:41) (53 - 61)  BP: 108/66 (01-23-22 @ 20:41) (108/66 - 121/79)  RR: 18 (01-23-22 @ 20:41) (18 - 18)  SpO2: 99% (01-23-22 @ 18:17) (99% - 99%)  Vent Data   Intake/ Output   01-23-22 @ 07:01  -  01-24-22 @ 07:00  --------------------------------------------------------  IN: 400 mL / OUT: 0 mL / NET: 400 mL          Physical Exam  * General Appearance: Alert, cooperative, interactive, well-groomed, oriented to time, place, and person, in no acute distress  * Head: Normocephalic, without obvious abnormality, atraumatic  * Eyes: Extraocular Movements Intact, pupils equal and reactive to light, conjunctiva/corneas clear and anicteric  * Mouth: Lips, mucosa, and tongue unremarkable; teeth and gums unremarkable  * Neck: no adenopathy;   * Back: Symmetric, no curvature, ROM within normal limits, no CVA tenderness  * Lungs: Respirations unlabored, Good bilateral air entry, vesicular breath sounds: clear to auscultation bilaterally, no audible wheezes, crackles, or rhonchi  * Heart: Regular Rate and Rhythm, normal S1 and S2, no murmurs, rubs, or gallops audible  * Abdomen: Symmetric, non-distended, no surgical scars appreciated, soft, non-tender, bowel sounds present, no masses, no organomegaly  * Extremities: no lower extremity pitting edema bilaterally, Extremities unremarkable, atraumatic, no cyanosis  * Skin: Skin color, texture, turgor normal, no rashes or lesions  * Neurologic:	 no nystagmus, strength: 5, reflexes: 2+, sensation intact, good muscle tone, no spasticity noted, CNII-XII intact, finger nose finger intact  *Psych: Affect: , No suicidal ideation, homicidal ideation, visual hallucinations, auditory hallucinations, fixations, or delusions    Past Medical and Surgical History:  No pertinent past medical history        Social History:  Social History:  non smoker  no ETOH use (22 Jan 2022 23:43)      Allergies:  Allergy Status Unknown        Investigations   Laboratory Workup  - CBC:                        11.7   5.11  )-----------( 155      ( 24 Jan 2022 06:03 )             35.2     - Chemistry:  01-24    131<L>  |  102  |  10  ----------------------------<  128<H>  3.8   |  18  |  0.6<L>    Ca    7.8<L>      24 Jan 2022 06:03  Phos  2.3     01-24  Mg     1.8     01-24    TPro  5.0<L>  /  Alb  3.0<L>  /  TBili  1.0  /  DBili  0.6<H>  /  AST  165<H>  /  ALT  331<H>  /  AlkPhos  147<H>  01-24    - Coagulation Studies:  PT/INR - ( 24 Jan 2022 06:03 )   PT: 11.60 sec;   INR: 1.01 ratio         PTT - ( 24 Jan 2022 06:03 )  PTT:37.8 sec  - ABG:    - Cardiac Markers:  CARDIAC MARKERS ( 22 Jan 2022 19:01 )  x     / <0.01 ng/mL / x     / x     / x            Microbiological Workup  Urinalysis Basic - ( 22 Jan 2022 22:17 )    Color: Teresa / Appearance: Clear / SG: >1.050 / pH: x  Gluc: x / Ketone: Small  / Bili: Moderate / Urobili: 3 mg/dL   Blood: x / Protein: 30 mg/dL / Nitrite: Negative   Leuk Esterase: Negative / RBC: 1 /HPF / WBC 6 /HPF   Sq Epi: x / Non Sq Epi: 1 /HPF / Bacteria: Negative        Culture - Urine (collected 22 Jan 2022 22:17)  Source: Clean Catch Clean Catch (Midstream)  Preliminary Report (24 Jan 2022 10:21):    10,000 - 49,000 CFU/mL Gram Negative Rods    Culture - Blood (collected 22 Jan 2022 19:01)  Source: .Blood Blood-Peripheral  Preliminary Report (23 Jan 2022 22:02):    No growth to date.    Culture - Blood (collected 22 Jan 2022 19:01)  Source: .Blood Blood-Peripheral  Preliminary Report (23 Jan 2022 22:02):    No growth to date.        Radiological Workup  *      Current Medications  Standing Medications  cefTRIAXone   IVPB 2000 milliGRAM(s) IV Intermittent every 24 hours  chlorhexidine 4% Liquid 1 Application(s) Topical every 24 hours  dextrose 40% Gel 15 Gram(s) Oral once  dextrose 5%. 1000 milliLiter(s) (50 mL/Hr) IV Continuous <Continuous>  dextrose 5%. 1000 milliLiter(s) (100 mL/Hr) IV Continuous <Continuous>  dextrose 50% Injectable 25 Gram(s) IV Push once  dextrose 50% Injectable 12.5 Gram(s) IV Push once  dextrose 50% Injectable 25 Gram(s) IV Push once  enoxaparin Injectable 40 milliGRAM(s) SubCutaneous at bedtime  glucagon  Injectable 1 milliGRAM(s) IntraMuscular once  lactated ringers. 1000 milliLiter(s) (100 mL/Hr) IV Continuous <Continuous>  LORazepam   Injectable 1 milliGRAM(s) IV Push once  metroNIDAZOLE  IVPB 500 milliGRAM(s) IV Intermittent every 8 hours  pantoprazole    Tablet 40 milliGRAM(s) Oral before breakfast    PRN Medications  morphine  - Injectable 2 milliGRAM(s) IV Push every 6 hours PRN Moderate Pain (4 - 6)  ondansetron Injectable 4 milliGRAM(s) IV Push every 8 hours PRN Nausea and/or Vomiting

## 2022-01-24 NOTE — PROGRESS NOTE ADULT - SUBJECTIVE AND OBJECTIVE BOX
Patient is a 50 y/o male with PMHx of DM, Fatty Liver, recently on testosterone who presented to the ED with abdominal pain. Patient notes pain was initially RUQ, and LUQ, without radiation. Patient feels better since admission. Patient eating breakfast. No current complaints.      PAST MEDICAL & SURGICAL HISTORY:  Pre-DM  Testosterone use      FAMILY HISTORY:  non-contributory    Social History:  Tobacco: denies   Alcohol: Denies  Drugs: denies    Home Medications:    MEDICATIONS  (STANDING):  cefTRIAXone   IVPB 2000 milliGRAM(s) IV Intermittent every 24 hours  chlorhexidine 4% Liquid 1 Application(s) Topical every 24 hours  dextrose 40% Gel 15 Gram(s) Oral once  dextrose 5%. 1000 milliLiter(s) (50 mL/Hr) IV Continuous <Continuous>  dextrose 5%. 1000 milliLiter(s) (100 mL/Hr) IV Continuous <Continuous>  dextrose 50% Injectable 25 Gram(s) IV Push once  dextrose 50% Injectable 12.5 Gram(s) IV Push once  dextrose 50% Injectable 25 Gram(s) IV Push once  enoxaparin Injectable 40 milliGRAM(s) SubCutaneous at bedtime  glucagon  Injectable 1 milliGRAM(s) IntraMuscular once  lactated ringers. 1000 milliLiter(s) (100 mL/Hr) IV Continuous <Continuous>  metroNIDAZOLE    Tablet 500 milliGRAM(s) Oral every 8 hours  pantoprazole    Tablet 40 milliGRAM(s) Oral before breakfast    MEDICATIONS  (PRN):  morphine  - Injectable 2 milliGRAM(s) IV Push every 6 hours PRN Moderate Pain (4 - 6)  ondansetron Injectable 4 milliGRAM(s) IV Push every 8 hours PRN Nausea and/or Vomiting      Allergies  Allergy Status Unknown      Review of Systems:   Constitutional:  No Fever, No Chills  ENT/Mouth:  No Hearing Changes,  No Difficulty Swallowing  Eyes:  No Eye Pain, No Vision Changes  Cardiovascular:  No Chest Pain, No Palpitations  Respiratory:  No Cough, No Dyspnea  Gastrointestinal:  As described in HPI  Musculoskeletal:  No Joint Swelling, No Back Pain  Skin:  No Skin Lesions, No Jaundice  Neuro:  No Syncope, No Dizziness  Heme/Lymph:  No Bruising, No Bleeding.        Vital Signs   T(F): 97.3 (23 Jan 2022 20:41), Max: 97.3 (23 Jan 2022 20:41)  HR: 61 (23 Jan 2022 20:41) (53 - 61)  BP: 108/66 (23 Jan 2022 20:41) (108/66 - 121/79)  RR: 18 (23 Jan 2022 20:41) (18 - 18)  SpO2: 99% (23 Jan 2022 18:17) (99% - 99%)  Constitutional: No acute distress.  Eyes:. Conjunctivae are clear, Sclera is icteric.  Ears Nose and Throat: The external ears are normal appearing,  Oral mucosa is pink and moist.  Respiratory:  No signs of respiratory distress. Lung sounds are clear bilaterally.  Cardiovascular:  S1 S2, Regular rate and rhythm.  GI: Abdomen is soft, symmetric, and non-tender without distention. There are no visible lesions. Bowel sounds are present and normoactive in all four quadrants. No masses, hepatomegaly, or splenomegaly are noted.   Neuro: No Tremor, No involuntary movements  Skin: No rashes,  Jaundice.        Labs:                        14.8   6.71  )-----------( 170      ( 22 Jan 2022 19:01 )             44.7     01-23    137  |  106  |  13  ----------------------------<  203<H>  3.5   |  20  |  0.7    Ca    7.7<L>      23 Jan 2022 04:30  Phos  2.1     01-23  Mg     2.3     01-23    TPro  5.1<L>  /  Alb  3.0<L>  /  TBili  2.4<H>  /  DBili  x   /  AST  277<H>  /  ALT  441<H>  /  AlkPhos  165<H>  01-23      Radiology:  US Abdomen Upper Quadrant Right (01.23.22 @ 00:49) >  IMPRESSION:    Cholelithiasis and gallbladder sludge without pericholecystic fluid or   gallbladder wall thickening. Reported negative sonographic Grigsby's sign.   Therefore, no sonographic evidence of acute cholecystitis.    Hepatic steatosis.      CT Abdomen and Pelvis w/ IV Cont 01.22.22   IMPRESSION:    No CT evidence of acute abdominopelvic pathology.    See body of the report for additional findings.

## 2022-01-24 NOTE — PROGRESS NOTE ADULT - SUBJECTIVE AND OBJECTIVE BOX
SUBJECTIVE:    Patient is a 51y old Male who presents with a chief complaint of abdominal pain  fever (24 Jan 2022 11:38)    Currently admitted to medicine with the primary diagnosis of Sepsis       Today is hospital day 2d.     PAST MEDICAL & SURGICAL HISTORY  No pertinent past medical history      ALLERGIES:  Allergy Status Unknown    MEDICATIONS:  STANDING MEDICATIONS  cefTRIAXone   IVPB 2000 milliGRAM(s) IV Intermittent every 24 hours  chlorhexidine 4% Liquid 1 Application(s) Topical every 24 hours  dextrose 40% Gel 15 Gram(s) Oral once  dextrose 5%. 1000 milliLiter(s) IV Continuous <Continuous>  dextrose 5%. 1000 milliLiter(s) IV Continuous <Continuous>  dextrose 50% Injectable 25 Gram(s) IV Push once  dextrose 50% Injectable 12.5 Gram(s) IV Push once  dextrose 50% Injectable 25 Gram(s) IV Push once  enoxaparin Injectable 40 milliGRAM(s) SubCutaneous at bedtime  glucagon  Injectable 1 milliGRAM(s) IntraMuscular once  lactated ringers. 1000 milliLiter(s) IV Continuous <Continuous>  LORazepam   Injectable 1 milliGRAM(s) IV Push once  metroNIDAZOLE  IVPB 500 milliGRAM(s) IV Intermittent every 8 hours  pantoprazole    Tablet 40 milliGRAM(s) Oral before breakfast    PRN MEDICATIONS  morphine  - Injectable 2 milliGRAM(s) IV Push every 6 hours PRN  ondansetron Injectable 4 milliGRAM(s) IV Push every 8 hours PRN    VITALS:   T(F): 95.7  HR: 60  BP: 138/79  RR: 18  SpO2: 99%    LABS:                        11.7   5.11  )-----------( 155      ( 24 Jan 2022 06:03 )             35.2     01-24    131<L>  |  102  |  10  ----------------------------<  128<H>  3.8   |  18  |  0.6<L>    Ca    7.8<L>      24 Jan 2022 06:03  Phos  2.3     01-24  Mg     1.8     01-24    TPro  5.0<L>  /  Alb  3.0<L>  /  TBili  1.0  /  DBili  0.6<H>  /  AST  165<H>  /  ALT  331<H>  /  AlkPhos  147<H>  01-24    PT/INR - ( 24 Jan 2022 06:03 )   PT: 11.60 sec;   INR: 1.01 ratio         PTT - ( 24 Jan 2022 06:03 )  PTT:37.8 sec  Urinalysis Basic - ( 22 Jan 2022 22:17 )    Color: Teresa / Appearance: Clear / SG: >1.050 / pH: x  Gluc: x / Ketone: Small  / Bili: Moderate / Urobili: 3 mg/dL   Blood: x / Protein: 30 mg/dL / Nitrite: Negative   Leuk Esterase: Negative / RBC: 1 /HPF / WBC 6 /HPF   Sq Epi: x / Non Sq Epi: 1 /HPF / Bacteria: Negative        Lactate, Blood: 0.8 mmol/L (01-24-22 @ 06:03)      Culture - Urine (collected 22 Jan 2022 22:17)  Source: Clean Catch Clean Catch (Midstream)  Preliminary Report (24 Jan 2022 10:21):    10,000 - 49,000 CFU/mL Gram Negative Rods    Culture - Blood (collected 22 Jan 2022 19:01)  Source: .Blood Blood-Peripheral  Preliminary Report (23 Jan 2022 22:02):    No growth to date.    Culture - Blood (collected 22 Jan 2022 19:01)  Source: .Blood Blood-Peripheral  Preliminary Report (23 Jan 2022 22:02):    No growth to date.      CARDIAC MARKERS ( 22 Jan 2022 19:01 )  x     / <0.01 ng/mL / x     / x     / x          RADIOLOGY:    PHYSICAL EXAM:  GEN: No acute distress  LUNGS: Clear to auscultation bilaterally   HEART: S1/S2 present. RRR.   ABD/ GI: Soft, non-tender, non-distended. Bowel sounds present  EXT: NC/NC/NE/2+PP/TOLEDO  NEURO: AAOX3

## 2022-01-24 NOTE — PROGRESS NOTE ADULT - ASSESSMENT
Patient is a 52 y/o male with PMHx of DM, Fatty Liver, recently on testosterone who presented to the ED with abdominal pain. Patient notes pain was initially RUQ, and LUQ, without radiation. Patient feels better since admission. Patient eating breakfast. No current complaints. Patient with normal Physical exam. Eating well this morning. Would get MRCP but will need Ativan prior as is claustrophobic.       Fever with jaundice concern for cholangitis vs DILI ( testosterone ) vs viral hepatitis  - Afebrile today  - no leukocytosis  - anabolic androgen can cause cholestatic liver injury  - No herbal supplement  - CT/US: cholelithiasis w/o any biliary duct dilatation  - c/w IV abx  - Monitor LFTs with fractionated bilirubin daily  - MRCP- Will need Ativan prior as he is claustrophobic  - HAV IgM/IgG, HBsAg, HBsAb, HBcAb IgM/IgG, HCV Ab, Anti HEV, Serum Ferritin, iron studies, Ceruloplasmin level, ROXY, SMA, immunoglobolins, AMA. Patient is a 50 y/o male with PMHx of DM, Fatty Liver, recently on testosterone who presented to the ED with abdominal pain. Patient notes pain was initially RUQ, and LUQ, without radiation. Patient feels better since admission. Patient eating breakfast. No current complaints. Patient with normal Physical exam. Eating well this morning. Would get MRCP but will need Ativan prior as is claustrophobic.       Fever with jaundice concern for cholangitis, but unlikely clinically, has Cholecystitis and possible DILI ( testosterone ) vs viral hepatitis  - Afebrile today  - no leukocytosis  - anabolic androgen can cause cholestatic liver injury  - No herbal supplement  - CT/US: cholelithiasis w/o any biliary duct dilatation  - c/w IV abx  - Monitor LFTs with fractionated bilirubin daily  - MRCP- Will need Ativan prior as he is claustrophobic  - HAV IgM/IgG, HBsAg, HBsAb, HBcAb IgM/IgG, HCV Ab, Anti HEV, Serum Ferritin, iron studies, Ceruloplasmin level, ROXY, SMA, immunoglobolins, AMA. ortho eval  pain control

## 2022-01-25 LAB
-  AMIKACIN: SIGNIFICANT CHANGE UP
-  AMOXICILLIN/CLAVULANIC ACID: SIGNIFICANT CHANGE UP
-  AMPICILLIN/SULBACTAM: SIGNIFICANT CHANGE UP
-  AMPICILLIN: SIGNIFICANT CHANGE UP
-  AMPICILLIN: SIGNIFICANT CHANGE UP
-  AZTREONAM: SIGNIFICANT CHANGE UP
-  CEFAZOLIN: SIGNIFICANT CHANGE UP
-  CEFEPIME: SIGNIFICANT CHANGE UP
-  CEFOXITIN: SIGNIFICANT CHANGE UP
-  CEFTRIAXONE: SIGNIFICANT CHANGE UP
-  CIPROFLOXACIN: SIGNIFICANT CHANGE UP
-  CIPROFLOXACIN: SIGNIFICANT CHANGE UP
-  ERTAPENEM: SIGNIFICANT CHANGE UP
-  GENTAMICIN: SIGNIFICANT CHANGE UP
-  IMIPENEM: SIGNIFICANT CHANGE UP
-  LEVOFLOXACIN: SIGNIFICANT CHANGE UP
-  LEVOFLOXACIN: SIGNIFICANT CHANGE UP
-  MEROPENEM: SIGNIFICANT CHANGE UP
-  NITROFURANTOIN: SIGNIFICANT CHANGE UP
-  NITROFURANTOIN: SIGNIFICANT CHANGE UP
-  PIPERACILLIN/TAZOBACTAM: SIGNIFICANT CHANGE UP
-  TETRACYCLINE: SIGNIFICANT CHANGE UP
-  TIGECYCLINE: SIGNIFICANT CHANGE UP
-  TOBRAMYCIN: SIGNIFICANT CHANGE UP
-  TRIMETHOPRIM/SULFAMETHOXAZOLE: SIGNIFICANT CHANGE UP
-  VANCOMYCIN: SIGNIFICANT CHANGE UP
ALBUMIN SERPL ELPH-MCNC: 3.5 G/DL — SIGNIFICANT CHANGE UP (ref 3.5–5.2)
ALBUMIN SERPL ELPH-MCNC: 3.5 G/DL — SIGNIFICANT CHANGE UP (ref 3.5–5.2)
ALP SERPL-CCNC: 155 U/L — HIGH (ref 30–115)
ALP SERPL-CCNC: 161 U/L — HIGH (ref 30–115)
ALT FLD-CCNC: 274 U/L — HIGH (ref 0–41)
ALT FLD-CCNC: 284 U/L — HIGH (ref 0–41)
ANION GAP SERPL CALC-SCNC: 13 MMOL/L — SIGNIFICANT CHANGE UP (ref 7–14)
ANION GAP SERPL CALC-SCNC: 9 MMOL/L — SIGNIFICANT CHANGE UP (ref 7–14)
APTT BLD: 36.4 SEC — SIGNIFICANT CHANGE UP (ref 27–39.2)
AST SERPL-CCNC: 107 U/L — HIGH (ref 0–41)
AST SERPL-CCNC: 116 U/L — HIGH (ref 0–41)
BASOPHILS # BLD AUTO: 0.02 K/UL — SIGNIFICANT CHANGE UP (ref 0–0.2)
BASOPHILS # BLD AUTO: 0.03 K/UL — SIGNIFICANT CHANGE UP (ref 0–0.2)
BASOPHILS NFR BLD AUTO: 0.4 % — SIGNIFICANT CHANGE UP (ref 0–1)
BASOPHILS NFR BLD AUTO: 0.6 % — SIGNIFICANT CHANGE UP (ref 0–1)
BILIRUB DIRECT SERPL-MCNC: 0.5 MG/DL — HIGH (ref 0–0.3)
BILIRUB INDIRECT FLD-MCNC: 0.6 MG/DL — SIGNIFICANT CHANGE UP (ref 0.2–1.2)
BILIRUB SERPL-MCNC: 0.7 MG/DL — SIGNIFICANT CHANGE UP (ref 0.2–1.2)
BILIRUB SERPL-MCNC: 1.1 MG/DL — SIGNIFICANT CHANGE UP (ref 0.2–1.2)
BLD GP AB SCN SERPL QL: SIGNIFICANT CHANGE UP
BUN SERPL-MCNC: 12 MG/DL — SIGNIFICANT CHANGE UP (ref 10–20)
BUN SERPL-MCNC: 13 MG/DL — SIGNIFICANT CHANGE UP (ref 10–20)
CALCIUM SERPL-MCNC: 8.1 MG/DL — LOW (ref 8.5–10.1)
CALCIUM SERPL-MCNC: 8.8 MG/DL — SIGNIFICANT CHANGE UP (ref 8.5–10.1)
CERULOPLASMIN SERPL-MCNC: 27 MG/DL — SIGNIFICANT CHANGE UP (ref 15–30)
CHLORIDE SERPL-SCNC: 100 MMOL/L — SIGNIFICANT CHANGE UP (ref 98–110)
CHLORIDE SERPL-SCNC: 103 MMOL/L — SIGNIFICANT CHANGE UP (ref 98–110)
CO2 SERPL-SCNC: 20 MMOL/L — SIGNIFICANT CHANGE UP (ref 17–32)
CO2 SERPL-SCNC: 22 MMOL/L — SIGNIFICANT CHANGE UP (ref 17–32)
CREAT SERPL-MCNC: 0.6 MG/DL — LOW (ref 0.7–1.5)
CREAT SERPL-MCNC: 0.8 MG/DL — SIGNIFICANT CHANGE UP (ref 0.7–1.5)
CULTURE RESULTS: SIGNIFICANT CHANGE UP
EOSINOPHIL # BLD AUTO: 0.15 K/UL — SIGNIFICANT CHANGE UP (ref 0–0.7)
EOSINOPHIL # BLD AUTO: 0.2 K/UL — SIGNIFICANT CHANGE UP (ref 0–0.7)
EOSINOPHIL NFR BLD AUTO: 2.9 % — SIGNIFICANT CHANGE UP (ref 0–8)
EOSINOPHIL NFR BLD AUTO: 3.9 % — SIGNIFICANT CHANGE UP (ref 0–8)
FERRITIN SERPL-MCNC: 4350 NG/ML — HIGH (ref 30–400)
GLUCOSE BLDC GLUCOMTR-MCNC: 204 MG/DL — HIGH (ref 70–99)
GLUCOSE SERPL-MCNC: 209 MG/DL — HIGH (ref 70–99)
GLUCOSE SERPL-MCNC: 229 MG/DL — HIGH (ref 70–99)
HAV IGM SER-ACNC: SIGNIFICANT CHANGE UP
HBV CORE IGM SER-ACNC: SIGNIFICANT CHANGE UP
HBV SURFACE AG SER-ACNC: SIGNIFICANT CHANGE UP
HCT VFR BLD CALC: 38.3 % — LOW (ref 42–52)
HCT VFR BLD CALC: 40.6 % — LOW (ref 42–52)
HCV AB S/CO SERPL IA: 0.07 S/CO — SIGNIFICANT CHANGE UP (ref 0–0.99)
HCV AB SERPL-IMP: SIGNIFICANT CHANGE UP
HGB BLD-MCNC: 13 G/DL — LOW (ref 14–18)
HGB BLD-MCNC: 13.5 G/DL — LOW (ref 14–18)
IMM GRANULOCYTES NFR BLD AUTO: 0.2 % — SIGNIFICANT CHANGE UP (ref 0.1–0.3)
IMM GRANULOCYTES NFR BLD AUTO: 0.2 % — SIGNIFICANT CHANGE UP (ref 0.1–0.3)
INR BLD: 0.89 RATIO — SIGNIFICANT CHANGE UP (ref 0.65–1.3)
LYMPHOCYTES # BLD AUTO: 1.41 K/UL — SIGNIFICANT CHANGE UP (ref 1.2–3.4)
LYMPHOCYTES # BLD AUTO: 1.41 K/UL — SIGNIFICANT CHANGE UP (ref 1.2–3.4)
LYMPHOCYTES # BLD AUTO: 27.4 % — SIGNIFICANT CHANGE UP (ref 20.5–51.1)
LYMPHOCYTES # BLD AUTO: 27.8 % — SIGNIFICANT CHANGE UP (ref 20.5–51.1)
MAGNESIUM SERPL-MCNC: 1.9 MG/DL — SIGNIFICANT CHANGE UP (ref 1.8–2.4)
MAGNESIUM SERPL-MCNC: 2.2 MG/DL — SIGNIFICANT CHANGE UP (ref 1.8–2.4)
MCHC RBC-ENTMCNC: 29.9 PG — SIGNIFICANT CHANGE UP (ref 27–31)
MCHC RBC-ENTMCNC: 30.4 PG — SIGNIFICANT CHANGE UP (ref 27–31)
MCHC RBC-ENTMCNC: 33.3 G/DL — SIGNIFICANT CHANGE UP (ref 32–37)
MCHC RBC-ENTMCNC: 33.9 G/DL — SIGNIFICANT CHANGE UP (ref 32–37)
MCV RBC AUTO: 89.7 FL — SIGNIFICANT CHANGE UP (ref 80–94)
MCV RBC AUTO: 90 FL — SIGNIFICANT CHANGE UP (ref 80–94)
METHOD TYPE: SIGNIFICANT CHANGE UP
METHOD TYPE: SIGNIFICANT CHANGE UP
MONOCYTES # BLD AUTO: 0.46 K/UL — SIGNIFICANT CHANGE UP (ref 0.1–0.6)
MONOCYTES # BLD AUTO: 0.46 K/UL — SIGNIFICANT CHANGE UP (ref 0.1–0.6)
MONOCYTES NFR BLD AUTO: 8.9 % — SIGNIFICANT CHANGE UP (ref 1.7–9.3)
MONOCYTES NFR BLD AUTO: 9.1 % — SIGNIFICANT CHANGE UP (ref 1.7–9.3)
NEUTROPHILS # BLD AUTO: 2.96 K/UL — SIGNIFICANT CHANGE UP (ref 1.4–6.5)
NEUTROPHILS # BLD AUTO: 3.09 K/UL — SIGNIFICANT CHANGE UP (ref 1.4–6.5)
NEUTROPHILS NFR BLD AUTO: 58.4 % — SIGNIFICANT CHANGE UP (ref 42.2–75.2)
NEUTROPHILS NFR BLD AUTO: 60.2 % — SIGNIFICANT CHANGE UP (ref 42.2–75.2)
NRBC # BLD: 0 /100 WBCS — SIGNIFICANT CHANGE UP (ref 0–0)
NRBC # BLD: 0 /100 WBCS — SIGNIFICANT CHANGE UP (ref 0–0)
ORGANISM # SPEC MICROSCOPIC CNT: SIGNIFICANT CHANGE UP
PLATELET # BLD AUTO: 174 K/UL — SIGNIFICANT CHANGE UP (ref 130–400)
PLATELET # BLD AUTO: 198 K/UL — SIGNIFICANT CHANGE UP (ref 130–400)
POTASSIUM SERPL-MCNC: 3.7 MMOL/L — SIGNIFICANT CHANGE UP (ref 3.5–5)
POTASSIUM SERPL-MCNC: 4.3 MMOL/L — SIGNIFICANT CHANGE UP (ref 3.5–5)
POTASSIUM SERPL-SCNC: 3.7 MMOL/L — SIGNIFICANT CHANGE UP (ref 3.5–5)
POTASSIUM SERPL-SCNC: 4.3 MMOL/L — SIGNIFICANT CHANGE UP (ref 3.5–5)
PROT SERPL-MCNC: 5.4 G/DL — LOW (ref 6–8)
PROT SERPL-MCNC: 5.9 G/DL — LOW (ref 6–8)
PROTHROM AB SERPL-ACNC: 10.3 SEC — SIGNIFICANT CHANGE UP (ref 9.95–12.87)
RBC # BLD: 4.27 M/UL — LOW (ref 4.7–6.1)
RBC # BLD: 4.51 M/UL — LOW (ref 4.7–6.1)
RBC # FLD: 12.9 % — SIGNIFICANT CHANGE UP (ref 11.5–14.5)
RBC # FLD: 13.1 % — SIGNIFICANT CHANGE UP (ref 11.5–14.5)
SARS-COV-2 RNA SPEC QL NAA+PROBE: SIGNIFICANT CHANGE UP
SODIUM SERPL-SCNC: 131 MMOL/L — LOW (ref 135–146)
SODIUM SERPL-SCNC: 136 MMOL/L — SIGNIFICANT CHANGE UP (ref 135–146)
SPECIMEN SOURCE: SIGNIFICANT CHANGE UP
WBC # BLD: 5.07 K/UL — SIGNIFICANT CHANGE UP (ref 4.8–10.8)
WBC # BLD: 5.14 K/UL — SIGNIFICANT CHANGE UP (ref 4.8–10.8)
WBC # FLD AUTO: 5.07 K/UL — SIGNIFICANT CHANGE UP (ref 4.8–10.8)
WBC # FLD AUTO: 5.14 K/UL — SIGNIFICANT CHANGE UP (ref 4.8–10.8)

## 2022-01-25 PROCEDURE — 99233 SBSQ HOSP IP/OBS HIGH 50: CPT

## 2022-01-25 PROCEDURE — 99223 1ST HOSP IP/OBS HIGH 75: CPT

## 2022-01-25 RX ADMIN — CEFTRIAXONE 100 MILLIGRAM(S): 500 INJECTION, POWDER, FOR SOLUTION INTRAMUSCULAR; INTRAVENOUS at 05:23

## 2022-01-25 RX ADMIN — CHLORHEXIDINE GLUCONATE 1 APPLICATION(S): 213 SOLUTION TOPICAL at 05:30

## 2022-01-25 RX ADMIN — PANTOPRAZOLE SODIUM 40 MILLIGRAM(S): 20 TABLET, DELAYED RELEASE ORAL at 05:25

## 2022-01-25 RX ADMIN — Medication 100 MILLIGRAM(S): at 21:37

## 2022-01-25 RX ADMIN — Medication 100 MILLIGRAM(S): at 13:15

## 2022-01-25 RX ADMIN — ENOXAPARIN SODIUM 40 MILLIGRAM(S): 100 INJECTION SUBCUTANEOUS at 21:38

## 2022-01-25 RX ADMIN — Medication 100 MILLIGRAM(S): at 05:48

## 2022-01-25 NOTE — CONSULT NOTE ADULT - SUBJECTIVE AND OBJECTIVE BOX
SIBLEY SHIV  51y, Male  Allergy: Allergy Status Unknown      All historical available data reviewed.    HPI:  50 yo male, h/o pre diabetes, not on home medication presented for fever and abdominal pain  Patient started taking 2 testosterone pills per day since last week; Since then he is been having RU and HUSEYIN quadrant pain, away from meals, severe , non radiating, associated with SOB, nausea, headache. Pain is stabbing, relieved by Tylenol, associated with one episode of loose stools, and fever with chills that started 3 days ago  ROS otherwise negative     temp 102.5 108/61 mmHg room air  (22 Jan 2022 23:43)    FAMILY HISTORY:    PAST MEDICAL & SURGICAL HISTORY:  No pertinent past medical history          VITALS:  T(F): 97.8, Max: 102.5 (01-22-22 @ 19:02)  HR: 91  BP: 108/61  RR: 20Vital Signs Last 24 Hrs  T(C): 36.6 (22 Jan 2022 22:24), Max: 39.2 (22 Jan 2022 19:02)  T(F): 97.8 (22 Jan 2022 22:24), Max: 102.5 (22 Jan 2022 19:02)  HR: 91 (22 Jan 2022 22:24) (65 - 149)  BP: 108/61 (22 Jan 2022 22:24) (108/61 - 120/56)  BP(mean): --  RR: 20 (22 Jan 2022 22:24) (20 - 20)  SpO2: 99% (22 Jan 2022 22:24) (99% - 99%)    TESTS & MEASUREMENTS:                        14.8   6.71  )-----------( 170      ( 22 Jan 2022 19:01 )             44.7     01-22    133<L>  |  99  |  13  ----------------------------<  211<H>  4.6   |  20  |  0.9    Ca    8.2<L>      22 Jan 2022 19:01    TPro  6.6  /  Alb  4.0  /  TBili  3.5<H>  /  DBili  x   /  AST  482<H>  /  ALT  671<H>  /  AlkPhos  226<H>  01-22    LIVER FUNCTIONS - ( 22 Jan 2022 19:01 )  Alb: 4.0 g/dL / Pro: 6.6 g/dL / ALK PHOS: 226 U/L / ALT: 671 U/L / AST: 482 U/L / GGT: x             Urinalysis Basic - ( 22 Jan 2022 22:17 )    Color: Teresa / Appearance: Clear / SG: >1.050 / pH: x  Gluc: x / Ketone: Small  / Bili: Moderate / Urobili: 3 mg/dL   Blood: x / Protein: 30 mg/dL / Nitrite: Negative   Leuk Esterase: Negative / RBC: 1 /HPF / WBC 6 /HPF   Sq Epi: x / Non Sq Epi: 1 /HPF / Bacteria: Negative          RADIOLOGY & ADDITIONAL TESTS:  Personal review of radiological diagnostics performed  Echo and EKG results noted when applicable.     MEDICATIONS:  chlorhexidine 4% Liquid 1 Application(s) Topical every 24 hours  dextrose 40% Gel 15 Gram(s) Oral once  dextrose 5%. 1000 milliLiter(s) IV Continuous <Continuous>  dextrose 5%. 1000 milliLiter(s) IV Continuous <Continuous>  dextrose 50% Injectable 25 Gram(s) IV Push once  dextrose 50% Injectable 12.5 Gram(s) IV Push once  dextrose 50% Injectable 25 Gram(s) IV Push once  enoxaparin Injectable 40 milliGRAM(s) SubCutaneous at bedtime  glucagon  Injectable 1 milliGRAM(s) IntraMuscular once  lactated ringers. 1000 milliLiter(s) IV Continuous <Continuous>  morphine  - Injectable 2 milliGRAM(s) IV Push every 6 hours PRN  ondansetron Injectable 4 milliGRAM(s) IV Push every 8 hours PRN  pantoprazole    Tablet 40 milliGRAM(s) Oral before breakfast  piperacillin/tazobactam IVPB.. 3.375 Gram(s) IV Intermittent every 8 hours      ANTIBIOTICS:  piperacillin/tazobactam IVPB.. 3.375 Gram(s) IV Intermittent every 8 hours    
Gastroenterology Consultation:    Patient is a 51y old  Male who presents with a chief complaint of abdominal pain  fever (23 Jan 2022 05:52)      Admitted on: 01-22-22  HPI:  52 yo male, h/o pre diabetes, not on home medication presented for fever and abdominal pain  Patient started taking 2 testosterone pills per day since last week; Since then he is been having RU and HUSEYIN quadrant pain, away from meals, severe , non radiating, associated with SOB, nausea, headache. Pain is stabbing, relieved by Tylenol ( only took 2 tylenol), associated with one episode of loose stools, and fever with chills that started 3 days ago  ROS otherwise negative     temp 102.5 108/61 mmHg room air       Prior records Reviewed (Y/N): Y  History obtained from person other than patient (Y/N): N    Prior EGD: Never  Prior Colonoscopy: never      PAST MEDICAL & SURGICAL HISTORY:  No pertinent past medical history        FAMILY HISTORY:  non-contributory    Social History:  Tobacco: denies   Alcohol: Denies  Drugs: denies    Home Medications:    MEDICATIONS  (STANDING):  cefTRIAXone   IVPB 2000 milliGRAM(s) IV Intermittent every 24 hours  chlorhexidine 4% Liquid 1 Application(s) Topical every 24 hours  dextrose 40% Gel 15 Gram(s) Oral once  dextrose 5%. 1000 milliLiter(s) (50 mL/Hr) IV Continuous <Continuous>  dextrose 5%. 1000 milliLiter(s) (100 mL/Hr) IV Continuous <Continuous>  dextrose 50% Injectable 25 Gram(s) IV Push once  dextrose 50% Injectable 12.5 Gram(s) IV Push once  dextrose 50% Injectable 25 Gram(s) IV Push once  enoxaparin Injectable 40 milliGRAM(s) SubCutaneous at bedtime  glucagon  Injectable 1 milliGRAM(s) IntraMuscular once  lactated ringers. 1000 milliLiter(s) (100 mL/Hr) IV Continuous <Continuous>  metroNIDAZOLE    Tablet 500 milliGRAM(s) Oral every 8 hours  pantoprazole    Tablet 40 milliGRAM(s) Oral before breakfast    MEDICATIONS  (PRN):  morphine  - Injectable 2 milliGRAM(s) IV Push every 6 hours PRN Moderate Pain (4 - 6)  ondansetron Injectable 4 milliGRAM(s) IV Push every 8 hours PRN Nausea and/or Vomiting      Allergies  Allergy Status Unknown      Review of Systems:   Constitutional:  No Fever, No Chills  ENT/Mouth:  No Hearing Changes,  No Difficulty Swallowing  Eyes:  No Eye Pain, No Vision Changes  Cardiovascular:  No Chest Pain, No Palpitations  Respiratory:  No Cough, No Dyspnea  Gastrointestinal:  As described in HPI  Musculoskeletal:  No Joint Swelling, No Back Pain  Skin:  No Skin Lesions, No Jaundice  Neuro:  No Syncope, No Dizziness  Heme/Lymph:  No Bruising, No Bleeding.          Physical Examination:  T(C): 35.6 (01-23-22 @ 13:12), Max: 39.2 (01-22-22 @ 19:02)  HR: 53 (01-23-22 @ 13:12) (53 - 149)  BP: 113/70 (01-23-22 @ 13:12) (93/57 - 120/56)  RR: 18 (01-23-22 @ 13:12) (18 - 20)  SpO2: 96% (01-23-22 @ 07:55) (96% - 99%)    Weight (kg): 65 (01-22-22 @ 19:00)    01-22-22 @ 07:01  -  01-23-22 @ 07:00  --------------------------------------------------------  IN: 750 mL / OUT: 800 mL / NET: -50 mL        Constitutional: No acute distress.  Eyes:. Conjunctivae are clear, Sclera is icteric.  Ears Nose and Throat: The external ears are normal appearing,  Oral mucosa is pink and moist.  Respiratory:  No signs of respiratory distress. Lung sounds are clear bilaterally.  Cardiovascular:  S1 S2, Regular rate and rhythm.  GI: Abdomen is soft, symmetric, and non-tender without distention. There are no visible lesions. Bowel sounds are present and normoactive in all four quadrants. No masses, hepatomegaly, or splenomegaly are noted.   Neuro: No Tremor, No involuntary movements  Skin: No rashes,  Jaundice.          Data: (reviewed by attending)                        14.8   6.71  )-----------( 170      ( 22 Jan 2022 19:01 )             44.7     Hgb Trend:  14.8  01-22-22 @ 19:01        01-23    137  |  106  |  13  ----------------------------<  203<H>  3.5   |  20  |  0.7    Ca    7.7<L>      23 Jan 2022 04:30  Phos  2.1     01-23  Mg     2.3     01-23    TPro  5.1<L>  /  Alb  3.0<L>  /  TBili  2.4<H>  /  DBili  x   /  AST  277<H>  /  ALT  441<H>  /  AlkPhos  165<H>  01-23    Liver panel trend:  TBili 2.4   /      /      /   AlkP 165   /   Tptn 5.1   /   Alb 3.0    /   DBili --      01-23  TBili 3.5   /      /      /   AlkP 226   /   Tptn 6.6   /   Alb 4.0    /   DBili --      01-22      PT/INR - ( 23 Jan 2022 04:30 )   PT: 11.60 sec;   INR: 1.01 ratio         PTT - ( 23 Jan 2022 04:30 )  PTT:31.2 sec        Radiology:(reviewed by attending)  CT Abdomen and Pelvis w/ IV Cont:   ACC: [id number] EXAM:  CT ABDOMEN AND PELVIS IC                          PROCEDURE DATE:  01/22/2022          INTERPRETATION:  INDICATION: Sepsis.    TECHNIQUE: Contiguous axial CT images were obtained from the lower chest   to the pubic symphysis following the administration of intravenous   contrast. Oral contrast was not administered. Reformatted images in the   coronal and sagittal planes were acquired.    COMPARISON: None    FINDINGS:    LOWER CHEST: Bibasilar atelectasis    HEPATOBILIARY: Hepatic steatosis. Cholelithiasis. No biliary ductal   dilatation.    SPLEEN: Unremarkable.    PANCREAS: Unremarkable.    ADRENAL GLANDS: Unremarkable.    KIDNEYS: Symmetric renal enhancement. No hydronephrosis.    ABDOMINOPELVIC NODES: Unremarkable.    PELVIC ORGANS: Unremarkable.    PERITONEUM/MESENTERY/BOWEL:  No evidence of bowel obstruction,   pneumoperitoneum, or ascites. Unremarkable appendix.    BONES/SOFT TISSUES: Bilateral L5 pars defects. Minimal anterolisthesis of   L5 on S1.    OTHER: Atherosclerotic calcifications.    IMPRESSION:    No CT evidence of acute abdominopelvic pathology.    See body of the report for additional findings.    --- End of Report ---          SHAWANDA TYSON MD; Resident Radiologist  This document has been electronically signed.  VIVEK WEAVER MD; Attending Radiologist  This document has been electronically signed. Jan 22 2022 10:21PM (01-22-22 @ 21:06)    US Abdomen Upper Quadrant Right:   ACC: [id number] EXAM:  US ABDOMEN RT UPR QUADRANT                          PROCEDURE DATE:  01/23/2022          INTERPRETATION:  CLINICAL INFORMATION: Right upper quadrant pain    COMPARISON: None available.    TECHNIQUE: Sonography of the right upper quadrant.    FINDINGS:    Liver: Increased echogenicity. Small relatively hypoechogenic area along   the gallbladder likely reflecting focal fatty sparing.  Bile ducts: Normal caliber. Common bile duct measures 4 mm.  Gallbladder: Cholelithiasis andgallbladder sludge. No pericholecystic   fluid or gallbladder wall thickening. Reported negative sonographic   Grigsby's sign.  Pancreas: Visualized portions are within normal limits.  Right kidney: 13.0 cm. No hydronephrosis.  Ascites: None.  IVC: Visualized portions are within normal limits.    IMPRESSION:    Cholelithiasis and gallbladder sludge without pericholecystic fluid or   gallbladder wall thickening. Reported negative sonographic Grigsby's sign.   Therefore, no sonographic evidence of acute cholecystitis.    Hepatic steatosis.    --- End of Report ---            XANDER SHOEMAKER MD; Attending Radiologist  This document has been electronically signed. Jan 23 2022  1:14AM (01-23-22 @ 00:49)    
GENERAL SURGERY CONSULT NOTE    Patient: SHIV SIBLEY , 51y ([birth year])Male   MRN: [id number]  Location: [address]  Visit: 01-22-22 Inpatient  Date: 01-25-22 @ 15:22    HPI:  52 yo male, h/o pre diabetes, not on home medication presented for fever and abdominal pain  Patient started taking 2 testosterone pills per day since last week; Since then he is been having RU and HUSEYIN quadrant pain, away from meals, severe , non radiating, associated with SOB, nausea, headache. Pain is stabbing, relieved by Tylenol, associated with one episode of loose stools, and fever with chills that started 3 days ago  ROS otherwise negative    Patient presented to the ED on Friday, complaining of epigastric pain radiating to bilateral costal margins, and was admitted and treated by medicine with IV Rocephin/Flagyl. U/S revealing of Gallstones, but no wall thickening or pericholecystic fluid. MRCP performed, no filling defect but some gallbladder wall edema. Since admission, patients pain has resolved but he has been intermittently vomiting. Currently denies fevers/chills/abdominal pain/diarrhea. Tolerating diet without issue, LFTs downtrending      PAST MEDICAL & SURGICAL HISTORY:  No pertinent past medical history        Home Medications:        VITALS:  T(F): 96.7 (01-25-22 @ 12:17), Max: 97.4 (01-25-22 @ 05:39)  HR: 63 (01-25-22 @ 12:17) (62 - 63)  BP: 125/76 (01-25-22 @ 12:17) (125/76 - 138/87)  RR: 18 (01-25-22 @ 12:17) (18 - 18)  SpO2: --    PHYSICAL EXAM:  General: NAD, AAOx3, calm and cooperative  HEENT: NCAT, PATT, EOMI, Trachea ML, Neck supple  Cardiac: RRR S1, S2, no Murmurs, rubs or gallops  Respiratory: CTAB, normal respiratory effort, breath sounds equal BL, no wheeze, rhonchi or crackles  Abdomen: Soft, non-distended, non-tender, no rebound, no guarding. +BS.  Musculoskeletal: Strength 5/5 BL UE/LE, ROM intact, compartments soft  Neuro: Sensation grossly intact and equal throughout, no focal deficits  Vascular: Pulses 2+ throughout, extremities well perfused  Skin: Warm/dry, normal color, no jaundice    MEDICATIONS  (STANDING):  cefTRIAXone   IVPB 2000 milliGRAM(s) IV Intermittent every 24 hours  chlorhexidine 4% Liquid 1 Application(s) Topical every 24 hours  dextrose 40% Gel 15 Gram(s) Oral once  dextrose 5%. 1000 milliLiter(s) (50 mL/Hr) IV Continuous <Continuous>  dextrose 5%. 1000 milliLiter(s) (100 mL/Hr) IV Continuous <Continuous>  dextrose 50% Injectable 25 Gram(s) IV Push once  dextrose 50% Injectable 12.5 Gram(s) IV Push once  dextrose 50% Injectable 25 Gram(s) IV Push once  enoxaparin Injectable 40 milliGRAM(s) SubCutaneous at bedtime  glucagon  Injectable 1 milliGRAM(s) IntraMuscular once  lactated ringers. 1000 milliLiter(s) (100 mL/Hr) IV Continuous <Continuous>  LORazepam   Injectable 1 milliGRAM(s) IV Push once  metroNIDAZOLE  IVPB 500 milliGRAM(s) IV Intermittent every 8 hours  pantoprazole    Tablet 40 milliGRAM(s) Oral before breakfast    MEDICATIONS  (PRN):  morphine  - Injectable 2 milliGRAM(s) IV Push every 6 hours PRN Moderate Pain (4 - 6)  ondansetron Injectable 4 milliGRAM(s) IV Push every 8 hours PRN Nausea and/or Vomiting      LAB/STUDIES:                        13.0   5.07  )-----------( 174      ( 25 Jan 2022 06:02 )             38.3     01-25    136  |  103  |  12  ----------------------------<  229<H>  3.7   |  20  |  0.6<L>    Ca    8.1<L>      25 Jan 2022 06:02  Phos  2.3     01-24  Mg     1.9     01-25    TPro  5.4<L>  /  Alb  3.5  /  TBili  1.1  /  DBili  0.5<H>  /  AST  116<H>  /  ALT  284<H>  /  AlkPhos  155<H>  01-25    PT/INR - ( 24 Jan 2022 06:03 )   PT: 11.60 sec;   INR: 1.01 ratio         PTT - ( 24 Jan 2022 06:03 )  PTT:37.8 sec  LIVER FUNCTIONS - ( 25 Jan 2022 06:02 )  Alb: 3.5 g/dL / Pro: 5.4 g/dL / ALK PHOS: 155 U/L / ALT: 284 U/L / AST: 116 U/L / GGT: x                         Culture - Blood (collected 23 Jan 2022 04:30)  Source: .Blood Blood-Arterial  Preliminary Report (24 Jan 2022 16:01):    No growth to date.    Culture - Urine (collected 22 Jan 2022 22:17)  Source: Clean Catch Clean Catch (Midstream)  Final Report (25 Jan 2022 13:11):    10,000 - 49,000 CFU/mL Morganella morganii    10,000 - 49,000 CFU/mL Enterococcus faecalis  Organism: Enterococcus faecalis  Morganella morganii (25 Jan 2022 13:11)  Organism: Morganella morganii (25 Jan 2022 13:11)  Organism: Enterococcus faecalis (25 Jan 2022 13:11)    Culture - Blood (collected 22 Jan 2022 19:01)  Source: .Blood Blood-Peripheral  Preliminary Report (23 Jan 2022 22:02):    No growth to date.    Culture - Blood (collected 22 Jan 2022 19:01)  Source: .Blood Blood-Peripheral  Preliminary Report (23 Jan 2022 22:02):    No growth to date.      IMAGING:  < from: US Abdomen Upper Quadrant Right (01.23.22 @ 00:49) >  IMPRESSION:    Cholelithiasis and gallbladder sludge without pericholecystic fluid or   gallbladder wall thickening. Reported negative sonographic Grigsby's sign.   Therefore, no sonographic evidence of acute cholecystitis.    Hepatic steatosis.    < end of copied text >      ACCESS DEVICES:  [ ] Peripheral IV  [ ] Central Venous Line	[ ] R	[ ] L	[ ] IJ	[ ] Fem	[ ] SC	Placed:   [ ] Arterial Line		[ ] R	[ ] L	[ ] Fem	[ ] Rad	[ ] Ax	Placed:   [ ] PICC:					[ ] Mediport  [ ] Urinary Catheter, Date Placed:

## 2022-01-25 NOTE — CONSULT NOTE ADULT - TIME BILLING
examine the patient, review images and labs, discuss the plan of care
Counseled patient about diagnostic testing and treatment plan. All questions answered.

## 2022-01-25 NOTE — PROGRESS NOTE ADULT - SUBJECTIVE AND OBJECTIVE BOX
Patient is a 50 y/o male with PMHx of DM, Fatty Liver, recently on testosterone who presented to the ED with abdominal pain. Patient notes pain was initially RUQ, and LUQ, without radiation. Patient feels better since admission. Patient reports now no pain for 2 days. MRI was done.       PAST MEDICAL & SURGICAL HISTORY:  Pre-DM  Testosterone use      FAMILY HISTORY:  non-contributory    Social History:  Tobacco: denies   Alcohol: Denies  Drugs: denies    Home Medications:    MEDICATIONS  (STANDING):  cefTRIAXone   IVPB 2000 milliGRAM(s) IV Intermittent every 24 hours  chlorhexidine 4% Liquid 1 Application(s) Topical every 24 hours  dextrose 40% Gel 15 Gram(s) Oral once  dextrose 5%. 1000 milliLiter(s) (50 mL/Hr) IV Continuous <Continuous>  dextrose 5%. 1000 milliLiter(s) (100 mL/Hr) IV Continuous <Continuous>  dextrose 50% Injectable 25 Gram(s) IV Push once  dextrose 50% Injectable 12.5 Gram(s) IV Push once  dextrose 50% Injectable 25 Gram(s) IV Push once  enoxaparin Injectable 40 milliGRAM(s) SubCutaneous at bedtime  glucagon  Injectable 1 milliGRAM(s) IntraMuscular once  lactated ringers. 1000 milliLiter(s) (100 mL/Hr) IV Continuous <Continuous>  metroNIDAZOLE    Tablet 500 milliGRAM(s) Oral every 8 hours  pantoprazole    Tablet 40 milliGRAM(s) Oral before breakfast    MEDICATIONS  (PRN):  morphine  - Injectable 2 milliGRAM(s) IV Push every 6 hours PRN Moderate Pain (4 - 6)  ondansetron Injectable 4 milliGRAM(s) IV Push every 8 hours PRN Nausea and/or Vomiting      Allergies  Allergy Status Unknown      Review of Systems:   Constitutional:  No Fever, No Chills  ENT/Mouth:  No Hearing Changes,  No Difficulty Swallowing  Eyes:  No Eye Pain, No Vision Changes  Cardiovascular:  No Chest Pain, No Palpitations  Respiratory:  No Cough, No Dyspnea  Gastrointestinal:  As described in HPI  Musculoskeletal:  No Joint Swelling, No Back Pain  Skin:  No Skin Lesions, No Jaundice  Neuro:  No Syncope, No Dizziness  Heme/Lymph:  No Bruising, No Bleeding.      Vital Signs   T(F): 97.4 (25 Jan 2022 05:39), Max: 97.4 (25 Jan 2022 05:39)  HR: 62 (25 Jan 2022 05:39) (60 - 62)  BP: 138/87 (25 Jan 2022 05:39) (138/79 - 138/87)  RR: 18 (25 Jan 2022 05:39) (18 - 18)  Constitutional: No acute distress.  Eyes:. Conjunctivae are clear, Sclera is icteric.  Ears Nose and Throat: The external ears are normal appearing,  Oral mucosa is pink and moist.  Respiratory:  No signs of respiratory distress. Lung sounds are clear bilaterally.  Cardiovascular:  S1 S2, Regular rate and rhythm.  GI: ND/NT  Neuro: No Tremor, No involuntary movements  Skin: No rashes,  Jaundice.        Labs:                                   13.0   5.07  )-----------( 174      ( 25 Jan 2022 06:02 )             38.3     01-25    136  |  103  |  12  ----------------------------<  229<H>  3.7   |  20  |  0.6<L>    Ca    8.1<L>      25 Jan 2022 06:02  Phos  2.3     01-24  Mg     1.9     01-25    TPro  5.4<L>  /  Alb  3.5  /  TBili  1.1  /  DBili  0.5<H>  /  AST  116<H>  /  ALT  284<H>  /  AlkPhos  155<H>  01-25      Radiology:  US Abdomen Upper Quadrant Right (01.23.22 @ 00:49) >  IMPRESSION:    Cholelithiasis and gallbladder sludge without pericholecystic fluid or   gallbladder wall thickening. Reported negative sonographic Grigsby's sign.   Therefore, no sonographic evidence of acute cholecystitis.    Hepatic steatosis.      CT Abdomen and Pelvis w/ IV Cont 01.22.22   IMPRESSION:    No CT evidence of acute abdominopelvic pathology.    See body of the report for additional findings.

## 2022-01-25 NOTE — PROGRESS NOTE ADULT - SUBJECTIVE AND OBJECTIVE BOX
SHIV SIBLEY  51y, Male    All available historical data reviewed    OVERNIGHT EVENTS:  no fevers  no abdominal pain    ROS:  General: Denies rigors, nightsweats  HEENT: Denies headache, rhinorrhea, sore throat, eye pain  CV: Denies CP, palpitations  PULM: Denies wheezing, hemoptysis  GI: Denies hematemesis, hematochezia, melena  : Denies discharge, hematuria  MSK: Denies arthralgias, myalgias  SKIN: Denies rash, lesions  NEURO: Denies paresthesias, weakness  PSYCH: Denies depression, anxiety    VITALS:  T(F): 97.4, Max: 97.4 (01-25-22 @ 05:39)  HR: 62  BP: 138/87  RR: 18Vital Signs Last 24 Hrs  T(C): 36.3 (25 Jan 2022 05:39), Max: 36.3 (25 Jan 2022 05:39)  T(F): 97.4 (25 Jan 2022 05:39), Max: 97.4 (25 Jan 2022 05:39)  HR: 62 (25 Jan 2022 05:39) (60 - 62)  BP: 138/87 (25 Jan 2022 05:39) (138/79 - 138/87)  BP(mean): --  RR: 18 (25 Jan 2022 05:39) (18 - 18)  SpO2: --    TESTS & MEASUREMENTS:                        13.0   5.07  )-----------( 174      ( 25 Jan 2022 06:02 )             38.3     01-25    136  |  103  |  12  ----------------------------<  229<H>  3.7   |  20  |  0.6<L>    Ca    8.1<L>      25 Jan 2022 06:02  Phos  2.3     01-24  Mg     1.9     01-25    TPro  5.4<L>  /  Alb  3.5  /  TBili  1.1  /  DBili  0.5<H>  /  AST  116<H>  /  ALT  284<H>  /  AlkPhos  155<H>  01-25    LIVER FUNCTIONS - ( 25 Jan 2022 06:02 )  Alb: 3.5 g/dL / Pro: 5.4 g/dL / ALK PHOS: 155 U/L / ALT: 284 U/L / AST: 116 U/L / GGT: x             Culture - Blood (collected 01-23-22 @ 04:30)  Source: .Blood Blood-Arterial  Preliminary Report (01-24-22 @ 16:01):    No growth to date.    Culture - Urine (collected 01-22-22 @ 22:17)  Source: Clean Catch Clean Catch (Midstream)  Preliminary Report (01-24-22 @ 17:13):    10,000 - 49,000 CFU/mL Morganella morganii  Organism: Morganella morganii (01-25-22 @ 08:26)  Organism: Morganella morganii (01-25-22 @ 08:26)      -  Amikacin: S <=16      -  Amoxicillin/Clavulanic Acid: R >16/8      -  Ampicillin: R >16 These ampicillin results predict results for amoxicillin      -  Ampicillin/Sulbactam: R >16/8 Enterobacter, Klebsiella aerogenes, Citrobacter, and Serratia may develop resistance during prolonged therapy (3-4 days)      -  Aztreonam: S <=4      -  Cefazolin: R >16      -  Cefepime: S <=2      -  Cefoxitin: S <=8      -  Ceftriaxone: S <=1 Enterobacter, Klebsiella aerogenes, Citrobacter, and Serratia may develop resistance during prolonged therapy      -  Ciprofloxacin: S <=0.25      -  Ertapenem: S <=0.5      -  Gentamicin: S <=2      -  Imipenem: R 4      -  Levofloxacin: S <=0.5      -  Meropenem: S <=1      -  Nitrofurantoin: R 64 Should not be used to treat pyelonephritis      -  Piperacillin/Tazobactam: S <=8      -  Tigecycline: R <=2      -  Tobramycin: S <=2      -  Trimethoprim/Sulfamethoxazole: S <=0.5/9.5      Method Type: FAUSTO    Culture - Blood (collected 01-22-22 @ 19:01)  Source: .Blood Blood-Peripheral  Preliminary Report (01-23-22 @ 22:02):    No growth to date.    Culture - Blood (collected 01-22-22 @ 19:01)  Source: .Blood Blood-Peripheral  Preliminary Report (01-23-22 @ 22:02):    No growth to date.            RADIOLOGY & ADDITIONAL TESTS:  Personal review of radiological diagnostics performed  Echo and EKG results noted when applicable.     MEDICATIONS:  cefTRIAXone   IVPB 2000 milliGRAM(s) IV Intermittent every 24 hours  chlorhexidine 4% Liquid 1 Application(s) Topical every 24 hours  dextrose 40% Gel 15 Gram(s) Oral once  dextrose 5%. 1000 milliLiter(s) IV Continuous <Continuous>  dextrose 5%. 1000 milliLiter(s) IV Continuous <Continuous>  dextrose 50% Injectable 25 Gram(s) IV Push once  dextrose 50% Injectable 12.5 Gram(s) IV Push once  dextrose 50% Injectable 25 Gram(s) IV Push once  enoxaparin Injectable 40 milliGRAM(s) SubCutaneous at bedtime  glucagon  Injectable 1 milliGRAM(s) IntraMuscular once  lactated ringers. 1000 milliLiter(s) IV Continuous <Continuous>  LORazepam   Injectable 1 milliGRAM(s) IV Push once  metroNIDAZOLE  IVPB 500 milliGRAM(s) IV Intermittent every 8 hours  morphine  - Injectable 2 milliGRAM(s) IV Push every 6 hours PRN  ondansetron Injectable 4 milliGRAM(s) IV Push every 8 hours PRN  pantoprazole    Tablet 40 milliGRAM(s) Oral before breakfast      ANTIBIOTICS:  cefTRIAXone   IVPB 2000 milliGRAM(s) IV Intermittent every 24 hours  metroNIDAZOLE  IVPB 500 milliGRAM(s) IV Intermittent every 8 hours

## 2022-01-25 NOTE — CONSULT NOTE ADULT - ATTENDING COMMENTS
Patient seen and examined, case discussed with the GI fellow on-call, assessment and plan above, work up in progress for Hepatitis and elevated LFTs, will follow up closely once initial work up in place
This is 50 y/o male who presented to the ED on Friday (1/21/22), complaining of epigastric pain radiating to bilateral costal margins, and was admitted and treated by medicine with IV Rocephin/Flagyl. U/S revealing of Gallstones, but no wall thickening or pericholecystic fluid. MRCP performed, no filling defect but some gallbladder wall edema. Since admission, patients pain has resolved but he has been intermittently vomiting.     PE:  AAO x3  Chest: clear.  CV ; RRR  Abdomen: soft, mildly tender in the RUQ, no rebound.    Patient had on admission T.Chandrakant 3.5 and elevated LFTs.    Abd. US, CT Abd/Pelvis and MRCP were reviewed.    ASSESSMENT:  50 y/o male with Acute calculous Cholecystitis.  Possible Choledocholithiasis.  Elevated LFTs.    PLAN:  - NPO, ivf, iv antibiotic  - Discussed with [facility] for EUS/ERCP in am  Will plan for laparoscopic Cholecystectomy, possible Open during this admission after EUS/ERCP.

## 2022-01-25 NOTE — PROGRESS NOTE ADULT - SUBJECTIVE AND OBJECTIVE BOX
SHIV SIBLEY MRN#: [id number]  CODE STATUS: FULL CODE     SUBJECTIVE   Chief complaint: abdominal pain  fever    Currently admitted to medicine with the primary diagnosis of SEPSIS; TRANSAMINITIS      Today is hospital day 3d,   INTERVAL HPI/OVERNIGHT EVENTS: No acute events overnight    This morning, he is laying in bed comfortably. Denies chest pain, shortness of breath, abdominal pain, nausea, vomiting or changes in bowel habits. He has no complaints today.     Urinating and stooling appropriately.    Present Today:           Ozuna Catheter (x)No/ ()Yes?   Indication:             Central Line (x)No/ ()Yes?  Indication:          IV Fluids (x)No/ ()Yes?  Indication:     OBJECTIVE  PAST MEDICAL & SURGICAL HISTORY  No pertinent past medical history      ALLERGIES:  Allergy Status Unknown    HOME MEDICATIONS:  Home Medications:    MEDICATIONS:  STANDING MEDICATIONS  MEDICATIONS  (STANDING):  cefTRIAXone   IVPB 2000 milliGRAM(s) IV Intermittent every 24 hours  chlorhexidine 4% Liquid 1 Application(s) Topical every 24 hours  dextrose 40% Gel 15 Gram(s) Oral once  dextrose 5%. 1000 milliLiter(s) (50 mL/Hr) IV Continuous <Continuous>  dextrose 5%. 1000 milliLiter(s) (100 mL/Hr) IV Continuous <Continuous>  dextrose 50% Injectable 25 Gram(s) IV Push once  dextrose 50% Injectable 12.5 Gram(s) IV Push once  dextrose 50% Injectable 25 Gram(s) IV Push once  enoxaparin Injectable 40 milliGRAM(s) SubCutaneous at bedtime  glucagon  Injectable 1 milliGRAM(s) IntraMuscular once  lactated ringers. 1000 milliLiter(s) (100 mL/Hr) IV Continuous <Continuous>  LORazepam   Injectable 1 milliGRAM(s) IV Push once  metroNIDAZOLE  IVPB 500 milliGRAM(s) IV Intermittent every 8 hours  pantoprazole    Tablet 40 milliGRAM(s) Oral before breakfast    PRN MEDICATIONS  MEDICATIONS  (PRN):  morphine  - Injectable 2 milliGRAM(s) IV Push every 6 hours PRN Moderate Pain (4 - 6)  ondansetron Injectable 4 milliGRAM(s) IV Push every 8 hours PRN Nausea and/or Vomiting      VITAL SIGNS  T(F): 97.4 (01-25 @ 05:39), Max: 97.4 (01-25 @ 05:39)  HR: 62 (01-25 @ 05:39) (60 - 62)  BP: 138/87 (01-25 @ 05:39) (138/79 - 138/87)  RR: 18 (01-25 @ 05:39) (18 - 18)  SpO2: --    LABS:                        13.0   5.07  )-----------( 174      ( 25 Jan 2022 06:02 )             38.3     01-25    136  |  103  |  12  ----------------------------<  229<H>  3.7   |  20  |  0.6<L>    Ca    8.1<L>      25 Jan 2022 06:02  Phos  2.3     01-24  Mg     1.9     01-25    TPro  5.4<L>  /  Alb  3.5  /  TBili  1.1  /  DBili  0.5<H>  /  AST  116<H>  /  ALT  284<H>  /  AlkPhos  155<H>  01-25    PT/INR - ( 24 Jan 2022 06:03 )   PT: 11.60 sec;   INR: 1.01 ratio         PTT - ( 24 Jan 2022 06:03 )  PTT:37.8 sec            Culture - Blood (collected 23 Jan 2022 04:30)  Source: .Blood Blood-Arterial  Preliminary Report:    No growth to date.    Culture - Urine (collected 22 Jan 2022 22:17)  Source: Clean Catch Clean Catch (Midstream)  Preliminary Report:    10,000 - 49,000 CFU/mL Morganella morganii  Organism: Morganella morganii  Organism: Morganella morganii    Culture - Blood (collected 22 Jan 2022 19:01)  Source: .Blood Blood-Peripheral  Preliminary Report:    No growth to date.    Culture - Blood (collected 22 Jan 2022 19:01)  Source: .Blood Blood-Peripheral  Preliminary Report:    No growth to date.        RADIOLOGY:   Reviewed    PHYSICAL EXAM:  General: Comfortably laying on the hospital bed. NAD. AAOx3.  HEENT: Atraumatic. Normocephalic. EOMI. Conjunctiva and sclera clear.  Cardiac: Normal S1, S2. RRR. No murmurs, rubs, or gallops.  Pulm: CTA b/l no wheezes, rhonchi, or rales.  GI: Soft, nontender, nondistended. BSx4q  Ext: 2+ pulses. Moving all 4 extremities. No edema, cyanosis.  Neuro: CN II-XII grossly intact  Skin: No lesions or rashes   SHIV SIBLEY MRN#: [id number]  CODE STATUS: FULL CODE     SUBJECTIVE   Chief complaint: abdominal pain  fever    Currently admitted to medicine with the primary diagnosis of SEPSIS; TRANSAMINITIS      Today is hospital day 3d,   INTERVAL HPI/OVERNIGHT EVENTS: s/p MRCP o/n    This morning, he is laying in bed comfortably. Denies chest pain, shortness of breath, abdominal pain, nausea, vomiting or changes in bowel habits. He has no complaints today.     Urinating and stooling appropriately.    Present Today:           Ozuna Catheter (x)No/ ()Yes?   Indication:             Central Line (x)No/ ()Yes?  Indication:          IV Fluids (x)No/ ()Yes?  Indication:     OBJECTIVE  PAST MEDICAL & SURGICAL HISTORY  No pertinent past medical history      ALLERGIES:  Allergy Status Unknown    HOME MEDICATIONS:  Home Medications:    MEDICATIONS:  STANDING MEDICATIONS  MEDICATIONS  (STANDING):  cefTRIAXone   IVPB 2000 milliGRAM(s) IV Intermittent every 24 hours  chlorhexidine 4% Liquid 1 Application(s) Topical every 24 hours  dextrose 40% Gel 15 Gram(s) Oral once  dextrose 5%. 1000 milliLiter(s) (50 mL/Hr) IV Continuous <Continuous>  dextrose 5%. 1000 milliLiter(s) (100 mL/Hr) IV Continuous <Continuous>  dextrose 50% Injectable 25 Gram(s) IV Push once  dextrose 50% Injectable 12.5 Gram(s) IV Push once  dextrose 50% Injectable 25 Gram(s) IV Push once  enoxaparin Injectable 40 milliGRAM(s) SubCutaneous at bedtime  glucagon  Injectable 1 milliGRAM(s) IntraMuscular once  lactated ringers. 1000 milliLiter(s) (100 mL/Hr) IV Continuous <Continuous>  LORazepam   Injectable 1 milliGRAM(s) IV Push once  metroNIDAZOLE  IVPB 500 milliGRAM(s) IV Intermittent every 8 hours  pantoprazole    Tablet 40 milliGRAM(s) Oral before breakfast    PRN MEDICATIONS  MEDICATIONS  (PRN):  morphine  - Injectable 2 milliGRAM(s) IV Push every 6 hours PRN Moderate Pain (4 - 6)  ondansetron Injectable 4 milliGRAM(s) IV Push every 8 hours PRN Nausea and/or Vomiting      VITAL SIGNS  T(F): 97.4 (01-25 @ 05:39), Max: 97.4 (01-25 @ 05:39)  HR: 62 (01-25 @ 05:39) (60 - 62)  BP: 138/87 (01-25 @ 05:39) (138/79 - 138/87)  RR: 18 (01-25 @ 05:39) (18 - 18)  SpO2: --    LABS:                        13.0   5.07  )-----------( 174      ( 25 Jan 2022 06:02 )             38.3     01-25    136  |  103  |  12  ----------------------------<  229<H>  3.7   |  20  |  0.6<L>    Ca    8.1<L>      25 Jan 2022 06:02  Phos  2.3     01-24  Mg     1.9     01-25    TPro  5.4<L>  /  Alb  3.5  /  TBili  1.1  /  DBili  0.5<H>  /  AST  116<H>  /  ALT  284<H>  /  AlkPhos  155<H>  01-25    PT/INR - ( 24 Jan 2022 06:03 )   PT: 11.60 sec;   INR: 1.01 ratio         PTT - ( 24 Jan 2022 06:03 )  PTT:37.8 sec            Culture - Blood (collected 23 Jan 2022 04:30)  Source: .Blood Blood-Arterial  Preliminary Report:    No growth to date.    Culture - Urine (collected 22 Jan 2022 22:17)  Source: Clean Catch Clean Catch (Midstream)  Preliminary Report:    10,000 - 49,000 CFU/mL Morganella morganii  Organism: Morganella morganii  Organism: Morganella morganii    Culture - Blood (collected 22 Jan 2022 19:01)  Source: .Blood Blood-Peripheral  Preliminary Report:    No growth to date.    Culture - Blood (collected 22 Jan 2022 19:01)  Source: .Blood Blood-Peripheral  Preliminary Report:    No growth to date.        RADIOLOGY:   Reviewed  CXR neg  CT A/P cholelithiasis, hepatic steatosis  RUQ US cholelithiasis, GB sludge  MRCP cholelithiasis, findings concerning for acute giuseppe      PHYSICAL EXAM:  General: Comfortably laying on the hospital bed. NAD. AAOx3.  HEENT: Atraumatic. Normocephalic. EOMI. Conjunctiva and sclera clear.  Cardiac: Normal S1, S2. RRR. No murmurs, rubs, or gallops.  Pulm: CTA b/l no wheezes, rhonchi, or rales.  GI: Soft, nontender, nondistended. BSx4q  Ext: 2+ pulses. Moving all 4 extremities. No edema, cyanosis.  Neuro: CN II-XII grossly intact  Skin: No lesions or rashes   SHIV SIBLEY MRN#: [id number]  CODE STATUS: FULL CODE     SUBJECTIVE   Chief complaint: abdominal pain  fever    Currently admitted to medicine with the primary diagnosis of SEPSIS; TRANSAMINITIS      Today is hospital day 3d,   INTERVAL HPI/OVERNIGHT EVENTS: s/p MRCP    This morning, he is laying in bed comfortably. Denies chest pain, shortness of breath, abdominal pain, nausea, vomiting or changes in bowel habits. He has no complaints today.     Urinating and stooling appropriately.    Present Today:           Ozuna Catheter (x)No/ ()Yes?   Indication:             Central Line (x)No/ ()Yes?  Indication:          IV Fluids (x)No/ ()Yes?  Indication:     OBJECTIVE  PAST MEDICAL & SURGICAL HISTORY  No pertinent past medical history      ALLERGIES:  Allergy Status Unknown    HOME MEDICATIONS:  Home Medications:    MEDICATIONS:  STANDING MEDICATIONS  MEDICATIONS  (STANDING):  cefTRIAXone   IVPB 2000 milliGRAM(s) IV Intermittent every 24 hours  chlorhexidine 4% Liquid 1 Application(s) Topical every 24 hours  dextrose 40% Gel 15 Gram(s) Oral once  dextrose 5%. 1000 milliLiter(s) (50 mL/Hr) IV Continuous <Continuous>  dextrose 5%. 1000 milliLiter(s) (100 mL/Hr) IV Continuous <Continuous>  dextrose 50% Injectable 25 Gram(s) IV Push once  dextrose 50% Injectable 12.5 Gram(s) IV Push once  dextrose 50% Injectable 25 Gram(s) IV Push once  enoxaparin Injectable 40 milliGRAM(s) SubCutaneous at bedtime  glucagon  Injectable 1 milliGRAM(s) IntraMuscular once  lactated ringers. 1000 milliLiter(s) (100 mL/Hr) IV Continuous <Continuous>  LORazepam   Injectable 1 milliGRAM(s) IV Push once  metroNIDAZOLE  IVPB 500 milliGRAM(s) IV Intermittent every 8 hours  pantoprazole    Tablet 40 milliGRAM(s) Oral before breakfast    PRN MEDICATIONS  MEDICATIONS  (PRN):  morphine  - Injectable 2 milliGRAM(s) IV Push every 6 hours PRN Moderate Pain (4 - 6)  ondansetron Injectable 4 milliGRAM(s) IV Push every 8 hours PRN Nausea and/or Vomiting      VITAL SIGNS  T(F): 97.4 (01-25 @ 05:39), Max: 97.4 (01-25 @ 05:39)  HR: 62 (01-25 @ 05:39) (60 - 62)  BP: 138/87 (01-25 @ 05:39) (138/79 - 138/87)  RR: 18 (01-25 @ 05:39) (18 - 18)  SpO2: --    LABS:                        13.0   5.07  )-----------( 174      ( 25 Jan 2022 06:02 )             38.3     01-25    136  |  103  |  12  ----------------------------<  229<H>  3.7   |  20  |  0.6<L>    Ca    8.1<L>      25 Jan 2022 06:02  Phos  2.3     01-24  Mg     1.9     01-25    TPro  5.4<L>  /  Alb  3.5  /  TBili  1.1  /  DBili  0.5<H>  /  AST  116<H>  /  ALT  284<H>  /  AlkPhos  155<H>  01-25    PT/INR - ( 24 Jan 2022 06:03 )   PT: 11.60 sec;   INR: 1.01 ratio         PTT - ( 24 Jan 2022 06:03 )  PTT:37.8 sec            Culture - Blood (collected 23 Jan 2022 04:30)  Source: .Blood Blood-Arterial  Preliminary Report:    No growth to date.    Culture - Urine (collected 22 Jan 2022 22:17)  Source: Clean Catch Clean Catch (Midstream)  Preliminary Report:    10,000 - 49,000 CFU/mL Morganella morganii  Organism: Morganella morganii  Organism: Morganella morganii    Culture - Blood (collected 22 Jan 2022 19:01)  Source: .Blood Blood-Peripheral  Preliminary Report:    No growth to date.    Culture - Blood (collected 22 Jan 2022 19:01)  Source: .Blood Blood-Peripheral  Preliminary Report:    No growth to date.        RADIOLOGY:   Reviewed  CXR neg  CT A/P cholelithiasis, hepatic steatosis  RUQ US cholelithiasis, GB sludge  MRCP cholelithiasis, findings concerning for acute giuseppe      PHYSICAL EXAM:  General: Comfortably laying on the hospital bed. NAD. AAOx3.  HEENT: Atraumatic. Normocephalic. EOMI. Conjunctiva and sclera clear.  Cardiac: Normal S1, S2. RRR. No murmurs, rubs, or gallops.  Pulm: CTA b/l no wheezes, rhonchi, or rales.  GI: Soft, nontender, nondistended. BSx4q  Ext: 2+ pulses. Moving all 4 extremities. No edema, cyanosis.  Neuro: CN II-XII grossly intact  Skin: No lesions or rashes

## 2022-01-25 NOTE — CONSULT NOTE ADULT - ASSESSMENT
ASSESSMENT:  51yM w/ PMHx of pre-diabetes  who presented with abdominal pain and elevated LFTs, Likely passed gallstone. Physical exam findings, imaging, and labs as documented above.     PLAN:  - Patient added on for cholecystectomy tomorrow, please make NPO at midnight, Trend LFTs, PT/INR, COVID PCR  - Surgery will continue to follow    Lines/Tubes: PIV    Above plan discussed with Attending Surgeon Dr. Bond, patient, patient family, and Primary team  01-25-22 @ 15:22

## 2022-01-25 NOTE — PROGRESS NOTE ADULT - ASSESSMENT
52 yo male, h/o pre diabetes, not on home medication presented for fever and abdominal pain  Patient started taking 2 testosterone pills per day since last week; Since then he is been having RU and HUSEYIN quadrant pain, away from meals, severe , non radiating, associated with SOB, nausea, headache. Pain is stabbing, relieved by Tylenol, associated with one episode of loose stools, and fever with chills that started 3 days ago  ROS otherwise negative    #  Ac cholecytitis-- patient is on iv ABX--RUQ pain sec to cholelithiasis but no sonographic evidence of ac cholecystitis-- MRCP showed ac cholecystitis-- patient is currently tolerating diet- seen by GI hepatitis panel is in lab, patient tolerating diet, ferritin is high.  surgery eval for cholecystectomy  # transaminits is improving-- direct bilirubin is trending down  #hx of diabetes-- patient is controlled only on diet--  hba1c is 7.0 and will start metformin at discharge and finger stick monitoring. urine noted for glycosuria.  # patient took testosterone from [facility] to build body-- says he needs it as he works for [employer]. currently off it.     Patient wants to leave soon-- DC planning if no surgery soon.   hepatitis w/u still in lab  spent more than 30mins.

## 2022-01-25 NOTE — PROGRESS NOTE ADULT - ASSESSMENT
Patient is a 52 y/o male with PMHx of DM, Fatty Liver, recently on testosterone who presented to the ED with abdominal pain. Patient notes pain was initially RUQ, and LUQ, without radiation. Patient feels better since admission. Patient eating breakfast. No current complaints. Patient with normal Physical exam. Patient with improvement of LFT, absent pain, and MRI without filling defect. Likely LFt abnormality from DILI. ID note was noted. Cholecystitis suggested but clinically without pain and improving. No need for Endoscopic intervention.       Fever with jaundice/ MRI without CBD stone  - Afebrile   - no leukocytosis  - anabolic androgen can cause cholestatic liver injury  - Does use [employer] supplements  - CT/US: cholelithiasis w/o any biliary duct dilatation  - MRI without ductal dilation   - Pending additional Hepatology panels  - Likely DILI  - Can consult surgery for Cholecystitis although well appearing currently , ABX as per ID Patient is a 52 y/o male with PMHx of DM, Fatty Liver, recently on testosterone who presented to the ED with abdominal pain. Patient notes pain was initially RUQ, and LUQ, without radiation. Patient feels better since admission. Patient eating breakfast. No current complaints. Patient with normal Physical exam. Patient with improvement of LFT, absent pain, and MRI without filling defect. Likely LFt abnormality from DILI. ID note was noted. Cholecystitis suggested but clinically without pain and improving. No need for Endoscopic intervention.       Fever with jaundice/ MRI without CBD stone  - Afebrile   - no leukocytosis  - CT/US: cholelithiasis w/o any biliary duct dilatation  - MRI without ductal dilation   - Can consult surgery for Cholecystitis  - Abx as per ID

## 2022-01-25 NOTE — PROGRESS NOTE ADULT - SUBJECTIVE AND OBJECTIVE BOX
SUBJECTIVE:    Patient is a 51y old Male who presents with a chief complaint of abdominal pain  fever (25 Jan 2022 11:38)    Currently admitted to medicine with the primary diagnosis of Sepsis       Today is hospital day 3d.     PAST MEDICAL & SURGICAL HISTORY  No pertinent past medical history      ALLERGIES:  Allergy Status Unknown    MEDICATIONS:  STANDING MEDICATIONS  cefTRIAXone   IVPB 2000 milliGRAM(s) IV Intermittent every 24 hours  chlorhexidine 4% Liquid 1 Application(s) Topical every 24 hours  dextrose 40% Gel 15 Gram(s) Oral once  dextrose 5%. 1000 milliLiter(s) IV Continuous <Continuous>  dextrose 5%. 1000 milliLiter(s) IV Continuous <Continuous>  dextrose 50% Injectable 25 Gram(s) IV Push once  dextrose 50% Injectable 12.5 Gram(s) IV Push once  dextrose 50% Injectable 25 Gram(s) IV Push once  enoxaparin Injectable 40 milliGRAM(s) SubCutaneous at bedtime  glucagon  Injectable 1 milliGRAM(s) IntraMuscular once  lactated ringers. 1000 milliLiter(s) IV Continuous <Continuous>  LORazepam   Injectable 1 milliGRAM(s) IV Push once  metroNIDAZOLE  IVPB 500 milliGRAM(s) IV Intermittent every 8 hours  pantoprazole    Tablet 40 milliGRAM(s) Oral before breakfast    PRN MEDICATIONS  morphine  - Injectable 2 milliGRAM(s) IV Push every 6 hours PRN  ondansetron Injectable 4 milliGRAM(s) IV Push every 8 hours PRN    VITALS:   T(F): 96.7  HR: 63  BP: 125/76  RR: 18  SpO2: --    LABS:                        13.0   5.07  )-----------( 174      ( 25 Jan 2022 06:02 )             38.3     01-25    136  |  103  |  12  ----------------------------<  229<H>  3.7   |  20  |  0.6<L>    Ca    8.1<L>      25 Jan 2022 06:02  Phos  2.3     01-24  Mg     1.9     01-25    TPro  5.4<L>  /  Alb  3.5  /  TBili  1.1  /  DBili  0.5<H>  /  AST  116<H>  /  ALT  284<H>  /  AlkPhos  155<H>  01-25    PT/INR - ( 24 Jan 2022 06:03 )   PT: 11.60 sec;   INR: 1.01 ratio         PTT - ( 24 Jan 2022 06:03 )  PTT:37.8 sec          Culture - Blood (collected 23 Jan 2022 04:30)  Source: .Blood Blood-Arterial  Preliminary Report (24 Jan 2022 16:01):    No growth to date.    Culture - Urine (collected 22 Jan 2022 22:17)  Source: Clean Catch Clean Catch (Midstream)  Preliminary Report (24 Jan 2022 17:13):    10,000 - 49,000 CFU/mL Morganella morganii  Organism: Morganella morganii (25 Jan 2022 08:26)  Organism: Morganella morganii (25 Jan 2022 08:26)    Culture - Blood (collected 22 Jan 2022 19:01)  Source: .Blood Blood-Peripheral  Preliminary Report (23 Jan 2022 22:02):    No growth to date.    Culture - Blood (collected 22 Jan 2022 19:01)  Source: .Blood Blood-Peripheral  Preliminary Report (23 Jan 2022 22:02):    No growth to date.          RADIOLOGY:    PHYSICAL EXAM:  GEN: No acute distress  LUNGS: Clear to auscultation bilaterally   HEART: S1/S2 present. RRR.   ABD/ GI: Soft, non-tender, non-distended. Bowel sounds present  EXT: NC/NC/NE/2+PP/TOLEDO  NEURO: AAOX3

## 2022-01-25 NOTE — PROGRESS NOTE ADULT - ASSESSMENT
· Assessment	  50 yo male, h/o pre diabetes, not on home medication presented for fever and abdominal pain  Patient started taking 2 testosterone pills per day since last week; Since then he is been having RU and HUSEYIN quadrant pain, away from meals, severe , non radiating, associated with SOB, nausea, headache. Pain is stabbing, relieved by Tylenol, associated with one episode of loose stools, and fever with chills that started 3 days ago   temp 102.5, BP  108/61     1/24 MR MRCP No Cont   1.  Cholelithiasis with mild gallbladder wall thickening and   pericholecystic fluid. Findings may represent acute cholecystitis in the   appropriate clinical setting. A nuclear medicine HIDA scan can be   obtained for confirmation if clinically warranted.  2.  No intra-/extrahepatic biliary ductal dilation nor evidence for   choledocholithiasis.    IMPRESSION;  Sepsis secondary to acute cholecystitis which has clinically resolved  No ongoing peritonitis  1/22,23 BCx NGTD    RECOMMENDATIONS;  Rocephin 2 gm iv q24h  Flagyl 500 mg iv q8h  Could change to po Levoquin 500 mg q24h and po Flagyl 500 mg q8h till 2/3  Please do not hesitate to recall ID if any questions arise either through [employer] or through [employer]

## 2022-01-25 NOTE — PROGRESS NOTE ADULT - ASSESSMENT
ASSESSMENT & PLAN  52 yo male, h/o pre diabetes, not on home medication presented for fever and abdominal pain.     # Acute cholangitis vs acute cholecystitis vs DILI vs viral hepatitis  # Septic on admission, resolved  - presented with RUQ abdominal pain, tenderness  - LA 5.5 on admission, improved to 1.30  - CT A/P significant for hepatic steatosis, cholelithiasis  - RUQ U/S significant for cholelithiasis, gallbladder sludge  - s/p ID eval - cont ceftriaxone, flagyl can change to PO levofloxacin and flagyl on dc   -     # DM2  - FS: 192, 226  - cont lantus, lispro, ISS  - FS qac, qhs    # HTN  - BP: 138/87 (138/79 - 138/87)    PT/REHAB   ACTIVITY: Activity - Ambulate as Tolerated    DVT PPX: enoxaparin Injectable    GI PPX: pantoprazole    Tablet   Not indicated  DIET: Diet, Consistent Carbohydrate w/Evening Snack        CODE: Full code   Disposition: from home;   Pending:   ASSESSMENT & PLAN  50 yo male, h/o pre diabetes, not on home medication presented for fever and abdominal pain.     # Acute cholangitis vs acute cholecystitis vs DILI vs viral hepatitis  # Septic on admission, resolved  - presented with RUQ abdominal pain, tenderness  - LA 5.5 on admission, improved to 1.30  - CT A/P significant for hepatic steatosis, cholelithiasis  - RUQ U/S significant for cholelithiasis, gallbladder sludge  - s/p ID eval - cont ceftriaxone, flagyl can change to PO levofloxacin and flagyl on dc  - s/p GI eval - MRCP, acute hepatitis panel, autoimmune w/u  - MRCP - cholelithiasis w/ signs concerning for acute giuseppe  - pt afebrile, no leukocytosis, HDS, minimal abdominal pain today  - GI f/u  - surgery consult for ?acute giuseppe    # DM2, newly diagnosed  - FS: 192, 226  - a1c 7.0%  - insulin protocol if needed  - FS qac, qhs  - metformin on discharge    PT/REHAB n/a  ACTIVITY: Activity - Ambulate as Tolerated  DVT PPX: enoxaparin Injectable  GI PPX: pantoprazole    Tablet  DIET: Diet, Consistent Carbohydrate w/Evening Snack  CODE: Full code   Disposition: from home; acute  Pending: GI f/u, surgery eval

## 2022-01-26 ENCOUNTER — RESULT REVIEW (OUTPATIENT)
Age: 52
End: 2022-01-26

## 2022-01-26 ENCOUNTER — TRANSCRIPTION ENCOUNTER (OUTPATIENT)
Age: 52
End: 2022-01-26

## 2022-01-26 LAB
ALBUMIN SERPL ELPH-MCNC: 3.8 G/DL — SIGNIFICANT CHANGE UP (ref 3.5–5.2)
ALP SERPL-CCNC: 164 U/L — HIGH (ref 30–115)
ALT FLD-CCNC: 286 U/L — HIGH (ref 0–41)
ANA TITR SER: NEGATIVE — SIGNIFICANT CHANGE UP
ANION GAP SERPL CALC-SCNC: 13 MMOL/L — SIGNIFICANT CHANGE UP (ref 7–14)
AST SERPL-CCNC: 157 U/L — HIGH (ref 0–41)
BASOPHILS # BLD AUTO: 0.03 K/UL — SIGNIFICANT CHANGE UP (ref 0–0.2)
BASOPHILS NFR BLD AUTO: 0.5 % — SIGNIFICANT CHANGE UP (ref 0–1)
BILIRUB DIRECT SERPL-MCNC: 0.4 MG/DL — HIGH (ref 0–0.3)
BILIRUB INDIRECT FLD-MCNC: 0.4 MG/DL — SIGNIFICANT CHANGE UP (ref 0.2–1.2)
BILIRUB SERPL-MCNC: 0.8 MG/DL — SIGNIFICANT CHANGE UP (ref 0.2–1.2)
BUN SERPL-MCNC: 14 MG/DL — SIGNIFICANT CHANGE UP (ref 10–20)
CALCIUM SERPL-MCNC: 8.4 MG/DL — LOW (ref 8.5–10.1)
CHLORIDE SERPL-SCNC: 103 MMOL/L — SIGNIFICANT CHANGE UP (ref 98–110)
CO2 SERPL-SCNC: 21 MMOL/L — SIGNIFICANT CHANGE UP (ref 17–32)
CREAT SERPL-MCNC: 0.7 MG/DL — SIGNIFICANT CHANGE UP (ref 0.7–1.5)
EOSINOPHIL # BLD AUTO: 0.18 K/UL — SIGNIFICANT CHANGE UP (ref 0–0.7)
EOSINOPHIL NFR BLD AUTO: 3 % — SIGNIFICANT CHANGE UP (ref 0–8)
GLUCOSE BLDC GLUCOMTR-MCNC: 133 MG/DL — HIGH (ref 70–99)
GLUCOSE BLDC GLUCOMTR-MCNC: 135 MG/DL — HIGH (ref 70–99)
GLUCOSE BLDC GLUCOMTR-MCNC: 143 MG/DL — HIGH (ref 70–99)
GLUCOSE BLDC GLUCOMTR-MCNC: 147 MG/DL — HIGH (ref 70–99)
GLUCOSE BLDC GLUCOMTR-MCNC: 183 MG/DL — HIGH (ref 70–99)
GLUCOSE SERPL-MCNC: 152 MG/DL — HIGH (ref 70–99)
HCT VFR BLD CALC: 41.9 % — LOW (ref 42–52)
HGB BLD-MCNC: 14.1 G/DL — SIGNIFICANT CHANGE UP (ref 14–18)
IMM GRANULOCYTES NFR BLD AUTO: 0.3 % — SIGNIFICANT CHANGE UP (ref 0.1–0.3)
LYMPHOCYTES # BLD AUTO: 1.39 K/UL — SIGNIFICANT CHANGE UP (ref 1.2–3.4)
LYMPHOCYTES # BLD AUTO: 23.4 % — SIGNIFICANT CHANGE UP (ref 20.5–51.1)
MAGNESIUM SERPL-MCNC: 2.2 MG/DL — SIGNIFICANT CHANGE UP (ref 1.8–2.4)
MCHC RBC-ENTMCNC: 30.3 PG — SIGNIFICANT CHANGE UP (ref 27–31)
MCHC RBC-ENTMCNC: 33.7 G/DL — SIGNIFICANT CHANGE UP (ref 32–37)
MCV RBC AUTO: 90.1 FL — SIGNIFICANT CHANGE UP (ref 80–94)
MITOCHONDRIA AB SER-ACNC: SIGNIFICANT CHANGE UP
MONOCYTES # BLD AUTO: 0.49 K/UL — SIGNIFICANT CHANGE UP (ref 0.1–0.6)
MONOCYTES NFR BLD AUTO: 8.2 % — SIGNIFICANT CHANGE UP (ref 1.7–9.3)
NEUTROPHILS # BLD AUTO: 3.84 K/UL — SIGNIFICANT CHANGE UP (ref 1.4–6.5)
NEUTROPHILS NFR BLD AUTO: 64.6 % — SIGNIFICANT CHANGE UP (ref 42.2–75.2)
NRBC # BLD: 0 /100 WBCS — SIGNIFICANT CHANGE UP (ref 0–0)
PLATELET # BLD AUTO: 214 K/UL — SIGNIFICANT CHANGE UP (ref 130–400)
POTASSIUM SERPL-MCNC: 4.3 MMOL/L — SIGNIFICANT CHANGE UP (ref 3.5–5)
POTASSIUM SERPL-SCNC: 4.3 MMOL/L — SIGNIFICANT CHANGE UP (ref 3.5–5)
PROT SERPL-MCNC: 6.3 G/DL — SIGNIFICANT CHANGE UP (ref 6–8)
RBC # BLD: 4.65 M/UL — LOW (ref 4.7–6.1)
RBC # FLD: 12.9 % — SIGNIFICANT CHANGE UP (ref 11.5–14.5)
SMOOTH MUSCLE AB SER-ACNC: SIGNIFICANT CHANGE UP
SODIUM SERPL-SCNC: 137 MMOL/L — SIGNIFICANT CHANGE UP (ref 135–146)
WBC # BLD: 5.95 K/UL — SIGNIFICANT CHANGE UP (ref 4.8–10.8)
WBC # FLD AUTO: 5.95 K/UL — SIGNIFICANT CHANGE UP (ref 4.8–10.8)

## 2022-01-26 PROCEDURE — 88305 TISSUE EXAM BY PATHOLOGIST: CPT | Mod: 26

## 2022-01-26 PROCEDURE — 99232 SBSQ HOSP IP/OBS MODERATE 35: CPT

## 2022-01-26 PROCEDURE — 43262 ENDO CHOLANGIOPANCREATOGRAPH: CPT | Mod: XU

## 2022-01-26 PROCEDURE — 88312 SPECIAL STAINS GROUP 1: CPT | Mod: 26

## 2022-01-26 PROCEDURE — 43274 ERCP DUCT STENT PLACEMENT: CPT

## 2022-01-26 PROCEDURE — 74330 X-RAY BILE/PANC ENDOSCOPY: CPT | Mod: 26

## 2022-01-26 PROCEDURE — 99233 SBSQ HOSP IP/OBS HIGH 50: CPT

## 2022-01-26 PROCEDURE — 43264 ERCP REMOVE DUCT CALCULI: CPT | Mod: XU

## 2022-01-26 PROCEDURE — 43239 EGD BIOPSY SINGLE/MULTIPLE: CPT | Mod: XU

## 2022-01-26 RX ORDER — SODIUM CHLORIDE 9 MG/ML
1000 INJECTION INTRAMUSCULAR; INTRAVENOUS; SUBCUTANEOUS
Refills: 0 | Status: DISCONTINUED | OUTPATIENT
Start: 2022-01-26 | End: 2022-01-27

## 2022-01-26 RX ORDER — INDOMETHACIN 50 MG
100 CAPSULE ORAL ONCE
Refills: 0 | Status: COMPLETED | OUTPATIENT
Start: 2022-01-26 | End: 2022-01-26

## 2022-01-26 RX ADMIN — Medication 100 MILLIGRAM(S): at 22:07

## 2022-01-26 RX ADMIN — ENOXAPARIN SODIUM 40 MILLIGRAM(S): 100 INJECTION SUBCUTANEOUS at 22:08

## 2022-01-26 RX ADMIN — PANTOPRAZOLE SODIUM 40 MILLIGRAM(S): 20 TABLET, DELAYED RELEASE ORAL at 05:40

## 2022-01-26 RX ADMIN — CEFTRIAXONE 100 MILLIGRAM(S): 500 INJECTION, POWDER, FOR SOLUTION INTRAMUSCULAR; INTRAVENOUS at 05:39

## 2022-01-26 RX ADMIN — SODIUM CHLORIDE 50 MILLILITER(S): 9 INJECTION INTRAMUSCULAR; INTRAVENOUS; SUBCUTANEOUS at 11:36

## 2022-01-26 RX ADMIN — Medication 100 MILLIGRAM(S): at 05:39

## 2022-01-26 RX ADMIN — Medication 100 MILLIGRAM(S): at 14:50

## 2022-01-26 NOTE — PROGRESS NOTE ADULT - SUBJECTIVE AND OBJECTIVE BOX
SHIV SIBLEY MRN#: [id number]  CODE STATUS: FULL CODE     SUBJECTIVE   Chief complaint: abdominal pain  fever    Currently admitted to medicine with the primary diagnosis of SEPSIS; TRANSAMINITIS      Today is hospital day 4d,   INTERVAL HPI/OVERNIGHT EVENTS: s/p surgery eval.    This morning, he is laying in bed comfortably. Denies chest pain, shortness of breath, abdominal pain, vomiting or changes in bowel habits. He has no complaints today. Pt upset because was no explained that he may be going for ERCP this morning. C/o dizziness and intermittent nausea.    Urinating and stooling appropriately.    Present Today:           [place] Catheter (x)No/ ()Yes?   Indication:             Central Line (x)No/ ()Yes?  Indication:          IV Fluids (x)No/ ()Yes?  Indication:     OBJECTIVE  PAST MEDICAL & SURGICAL HISTORY  No pertinent past medical history      ALLERGIES:  Allergy Status Unknown    HOME MEDICATIONS:  Home Medications:    MEDICATIONS:  STANDING MEDICATIONS  MEDICATIONS  (STANDING):  cefTRIAXone   IVPB 2000 milliGRAM(s) IV Intermittent every 24 hours  chlorhexidine 4% Liquid 1 Application(s) Topical every 24 hours  dextrose 40% Gel 15 Gram(s) Oral once  dextrose 5%. 1000 milliLiter(s) (50 mL/Hr) IV Continuous <Continuous>  dextrose 5%. 1000 milliLiter(s) (100 mL/Hr) IV Continuous <Continuous>  dextrose 50% Injectable 25 Gram(s) IV Push once  dextrose 50% Injectable 12.5 Gram(s) IV Push once  dextrose 50% Injectable 25 Gram(s) IV Push once  enoxaparin Injectable 40 milliGRAM(s) SubCutaneous at bedtime  glucagon  Injectable 1 milliGRAM(s) IntraMuscular once  lactated ringers. 1000 milliLiter(s) (100 mL/Hr) IV Continuous <Continuous>  LORazepam   Injectable 1 milliGRAM(s) IV Push once  metroNIDAZOLE  IVPB 500 milliGRAM(s) IV Intermittent every 8 hours  pantoprazole    Tablet 40 milliGRAM(s) Oral before breakfast    PRN MEDICATIONS  MEDICATIONS  (PRN):  morphine  - Injectable 2 milliGRAM(s) IV Push every 6 hours PRN Moderate Pain (4 - 6)  ondansetron Injectable 4 milliGRAM(s) IV Push every 8 hours PRN Nausea and/or Vomiting      VITAL SIGNS  T(F): 96.9 (01-26 @ 05:03), Max: 97.4 (01-25 @ 19:55)  HR: 64 (01-26 @ 05:03) (63 - 69)  BP: 128/82 (01-26 @ 05:03) (115/74 - 128/82)  RR: 18 (01-26 @ 05:03) (18 - 18)  SpO2: --    LABS:                        13.5   5.14  )-----------( 198      ( 25 Jan 2022 20:00 )             40.6     01-25    131<L>  |  100  |  13  ----------------------------<  209<H>  4.3   |  22  |  0.8    Ca    8.8      25 Jan 2022 20:00  Mg     2.2     01-25    TPro  5.9<L>  /  Alb  3.5  /  TBili  0.7  /  DBili  x   /  AST  107<H>  /  ALT  274<H>  /  AlkPhos  161<H>  01-25    PT/INR - ( 25 Jan 2022 20:00 )   PT: 10.30 sec;   INR: 0.89 ratio         PTT - ( 25 Jan 2022 20:00 )  PTT:36.4 sec      RADIOLOGY:   Reviewed  CXR neg  CT A/P cholelithiasis, hepatic steatosis  RUQ US cholelithiasis, GB sludge  MRCP cholelithiasis, findings concerning for acute giuseppe      PHYSICAL EXAM:  General: Comfortably laying on the hospital bed. NAD. AAOx3.  HEENT: Atraumatic. Normocephalic. EOMI. Conjunctiva and sclera clear.  Cardiac: Normal S1, S2. RRR. No murmurs, rubs, or gallops.  Pulm: CTA b/l no wheezes, rhonchi, or rales.  GI: Soft, nontender, nondistended. BSx4q  Ext: 2+ pulses. Moving all 4 extremities. No edema, cyanosis.  Neuro: CN II-XII grossly intact  Skin: No lesions or rashes

## 2022-01-26 NOTE — CHART NOTE - NSCHARTNOTEFT_GEN_A_CORE
PACU ANESTHESIA ADMISSION NOTE      Procedure:   Post op diagnosis:      ____  Intubated  TV:______       Rate: ______      FiO2: ______    __x__  Patent Airway    __x__  Full return of protective reflexes    __x__  Full recovery from anesthesia / back to baseline     Vitals:   T:  97.2         R:   16               BP:  107/77                Sat:  99%                 P: 87      Mental Status:  __x__ Awake   __x___ Alert   _____ Drowsy   _____ Sedated    Nausea/Vomiting:  __x__ NO  ______Yes,   See Post - Op Orders          Pain Scale (0-10):  __0___    Treatment: ____ None    ___x_ See Post - Op/PCA Orders    Post - Operative Fluids:   ____ Oral   __x__ See Post - Op Orders    Plan: Discharge:   __x__Home       _____Floor     _____Critical Care    _____  Other:_________________    Comments:  pt tolerated procedure well, pt transferred to PACU and report was given to PACU RN, vital signs are stable and pt shows no signs of distress. no anesthesia complications, discharge pt when criteria met.

## 2022-01-26 NOTE — PROGRESS NOTE ADULT - ASSESSMENT
ASSESSMENT:  51yM w/ PMHx of pre-diabetes  who presented with abdominal pain and elevated LFTs, Likely passed gallstone. Physical exam findings, imaging, and labs as documented above.     PLAN:  - Patient added on for cholecystectomy today, please make NPO at midnight, Trend LFTs, PT/INR, COVID PCR  - Surgery will continue to follow    Lines/Tubes: PIV    Above plan discussed with Attending Surgeon Dr. Bond, patient, patient family, and Primary team ASSESSMENT:  51yM w/ PMHx of pre-diabetes  who presented with abdominal pain and elevated LFTs, Likely passed gallstone. Physical exam findings, imaging, and labs as documented above.     PLAN:  - NPO, ivf, iv antibiotic  - Discussed with [facility] for EUS/ERCP in am  - Will plan for laparoscopic Cholecystectomy, possible Open during this admission after EUS/ERCP   - please make NPO at midnight, Trend LFTs, PT/INR, COVID PCR  - Surgery will continue to follow    Lines/Tubes: PIV    Above plan discussed with Attending Surgeon Dr. Bond, patient, patient family, and Primary team

## 2022-01-26 NOTE — CHART NOTE - NSCHARTNOTEFT_GEN_A_CORE
Pre-Op Note    Patient: SHIV SIBLEY  MRN: [id number]  51y Male  Location: [address]  22 @ 17:25    Admit Diagnosis: SEPSIS; TRANSAMINITIS    Procedure: Laparoscopic Cholecystectomy, possible open    Planned for     PreOp : NPO after midnight, IVF, CBC, BMP, Ca, Mg, Phosphorus, PT, PTT, INR, Type and Screen, Type and Cross, Within admission ECG and CXR, Active Covid PCR    Consent in Chart: [x] Yes [  ] No  Diet: Diet, NPO after Midnight:      NPO Start Date: 2022,   NPO Start Time: 23:59 (22 @ 16:21)    Fluids: dextrose 5%. 1000 milliLiter(s) (50 mL/Hr) IV Continuous <Continuous>  dextrose 5%. 1000 milliLiter(s) (100 mL/Hr) IV Continuous <Continuous>  lactated ringers. 1000 milliLiter(s) (100 mL/Hr) IV Continuous <Continuous>  sodium chloride 0.9%. 1000 milliLiter(s) (50 mL/Hr) IV Continuous <Continuous>    EK Lead ECG:   Ventricular Rate 149 BPM    Atrial Rate 149 BPM    P-R Interval 140 ms    QRS Duration 106 ms    Q-T Interval 268 ms    QTC Calculation(Bazett) 422 ms    P Axis 80 degrees    R Axis 169 degrees    T Axis 61 degrees    Diagnosis Line Sinus tachycardia  Right axis deviation  Right bundle branch block  Baseline artifact  Abnormal ECG    Confirmed by Jose White ([id number]) on 2022 4:26:58 AM (22 @ 18:53)    CXR:  Xray Chest 1 View- PORTABLE-Urgent:   ACC: [id number] EXAM:  XR CHEST PORTABLE URGENT 1V                          PROCEDURE DATE:  2022          INTERPRETATION:  STUDY INDICATION: Sepsis    Comparison: None.    Technique/Positioning: Frontal view of the chest were obtained.    Findings:    Support devices: None.    Cardiac/mediastinum/hilum: Within normal limits.    Lung parenchyma/Pleura: No consolidation, effusion or pneumothorax.    Skeleton/soft tissues:  No radiographically evident acute displaced   fracture within the limitations of this exam.    Impression:    No acute abnormality on frontal chest radiograph.    --- End of Report ---            XANDER SHOEMAKER MD; Attending Radiologist  This document has been electronically signed. 2022  5:37AM (22 @ 20:06)      Vitals:  T(F): 97.4 (22 @ 16:17), Max: 97.4 (22 @ 19:55)  HR: 74 (22 @ 15:55) (64 - 86)  BP: 113/73 (22 @ 15:55) (101/58 - 128/82)  RR: 12 (22 @ 15:55) (12 - 23)  SpO2: 98% (22 @ 15:55) (95% - 99%)    Pre-OP Labs:  CAPILLARY BLOOD GLUCOSE      POCT Blood Glucose.: 147 mg/dL (2022 16:40)  POCT Blood Glucose.: 135 mg/dL (2022 11:20)  POCT Blood Glucose.: 143 mg/dL (2022 07:34)  POCT Blood Glucose.: 133 mg/dL (2022 04:40)  POCT Blood Glucose.: 204 mg/dL (2022 21:43)                          14.1   5.95  )-----------( 214      ( 2022 04:30 )             41.9         137  |  103  |  14  ----------------------------<  152<H>  4.3   |  21  |  0.7    Ca    8.4<L>      2022 04:30  Mg     2.2         TPro  6.3  /  Alb  3.8  /  TBili  0.8  /  DBili  0.4<H>  /  AST  157<H>  /  ALT  286<H>  /  AlkPhos  164<H>      PT/INR - ( 2022 20:00 )   PT: 10.30 sec;   INR: 0.89 ratio         PTT - ( 2022 20:00 )  PTT:36.4 sec      Type & Screen: ABO RH Interpretation: A POS (22 @ 20:00)    COVID: COVID-19 PCR: NotDetec (2022 23:00)  SARS-CoV-2: NotDetec (2022 20:10)

## 2022-01-26 NOTE — CHART NOTE - NSCHARTNOTEFT_GEN_A_CORE
Prelim EGD note    EGD: Non erosive gastritis s/p biopsies EUS: CBD with possible stones, PD 5.2mm. ERCP: Double wire guided CBD cannulation. A 5F X 4cm PD stent was placed A 10F X 5cm plastic biliary stent was placed. Sphincterotomy performed with some oozing and achieved hemostasis. Sludge in the CBD removed.      REC:  Monitor for signs of pancreatitis. Start clear liquids after 4 hours.   Patient needs repeat ERCP in 4-6 weeks for removal of PD and CBD stent.  [facility] evaluation. Prelim EGD note    EGD: Non erosive gastritis s/p biopsies EUS: CBD with possible stones, PD 5.2mm. ERCP: Double wire guided CBD cannulation. A 5F X 4cm PD stent was placed A 10F X 5cm plastic biliary stent was placed. Sphincterotomy performed. Sludge in the CBD removed.      REC:  Monitor for signs of pancreatitis. Start clear liquids after 4 hours.   Patient needs repeat ERCP in 4-6 weeks for removal of PD and CBD stent.  [facility] evaluation.

## 2022-01-26 NOTE — PROGRESS NOTE ADULT - ASSESSMENT
ASSESSMENT & PLAN  50 yo male, h/o pre diabetes, not on home medication presented for fever and abdominal pain.     # Acute cholangitis vs acute cholecystitis with DILI  # Septic on admission, resolved  - presented with RUQ abdominal pain, tenderness  - recent hx of anabolic steroid use - can cause cholestatic injury per GI  - LA 5.5 on admission, improved to 1.30  - CT A/P significant for hepatic steatosis, cholelithiasis  - RUQ U/S significant for cholelithiasis, gallbladder sludge  - s/p ID eval - cont ceftriaxone, flagyl can change to PO levofloxacin and flagyl on dc  - s/p GI eval - MRCP, acute hepatitis panel, autoimmune w/u  - MRCP - cholelithiasis w/ signs concerning for acute giuseppe  - pt afebrile, no leukocytosis, HDS, pain has resolved  - s/p surgery eval - initially planned for lap giuseppe this AM, changed to poss ERCP with adv GI this AM. Lap giuseppe tomorrow  - [facility] f/u    # DM2, newly diagnosed  - FS: 143, 133, 204  - a1c 7.0%  - insulin protocol if needed  - FS qac, qhs  - metformin on discharge    PT/REHAB n/a  ACTIVITY: Activity - Ambulate as Tolerated  DVT PPX: enoxaparin Injectable  GI PPX: pantoprazole    Tablet  DIET: Diet, Consistent Carbohydrate w/Evening Snack  CODE: Full code   Disposition: from home; acute  Pending: [facility] for ERCP today?, Lap giuseppe tomorrow

## 2022-01-26 NOTE — PROGRESS NOTE ADULT - SUBJECTIVE AND OBJECTIVE BOX
GENERAL SURGERY    Patient: SHIV SIBLEY , 51y ([birth year])Male   MRN: [id number]  Location: [place] [address] A  Visit: 01-22-22 Inpatient    HPI:  52 yo male, h/o pre diabetes, not on home medication presented for fever and abdominal pain  Patient started taking 2 testosterone pills per day since last week; Since then he is been having RU and HUSEYIN quadrant pain, away from meals, severe , non radiating, associated with SOB, nausea, headache. Pain is stabbing, relieved by Tylenol, associated with one episode of loose stools, and fever with chills that started 3 days ago  ROS otherwise negative    Patient presented to the ED on Friday, complaining of epigastric pain radiating to bilateral costal margins, and was admitted and treated by medicine with IV Rocephin/Flagyl. U/S revealing of Gallstones, but no wall thickening or pericholecystic fluid. MRCP performed, no filling defect but some gallbladder wall edema. Since admission, patients pain has resolved but he has been intermittently vomiting. Currently denies fevers/chills/abdominal pain/diarrhea. Tolerating diet without issue, LFTs downtrending      PHYSICAL EXAM:  General: NAD, AAOx3, calm and cooperative  HEENT: NCAT, PATT, EOMI, Trachea ML, Neck supple  Cardiac: RRR S1, S2, no Murmurs, rubs or gallops  Respiratory: CTAB, normal respiratory effort, breath sounds equal BL, no wheeze, rhonchi or crackles  Abdomen: Soft, non-distended, non-tender, no rebound, no guarding. +BS.  Musculoskeletal: Strength 5/5 BL UE/LE, ROM intact, compartments soft  Neuro: Sensation grossly intact and equal throughout, no focal deficits  Vascular: Pulses 2+ throughout, extremities well perfused  Skin: Warm/dry, normal color, no jaundice    Vitals:   T(F): 97.4 (01-25-22 @ 19:55), Max: 97.4 (01-25-22 @ 05:39)  HR: 69 (01-25-22 @ 19:55)  BP: 115/74 (01-25-22 @ 19:55)  RR: 18 (01-25-22 @ 19:55)  SpO2: --    Diet, NPO after Midnight:      NPO Start Date: 25-Jan-2022,   NPO Start Time: 23:59  Except Medications  Diet, Consistent Carbohydrate w/Evening Snack      MEDICATIONS  (STANDING):  cefTRIAXone   IVPB 2000 milliGRAM(s) IV Intermittent every 24 hours  chlorhexidine 4% Liquid 1 Application(s) Topical every 24 hours  dextrose 40% Gel 15 Gram(s) Oral once  dextrose 5%. 1000 milliLiter(s) (50 mL/Hr) IV Continuous <Continuous>  dextrose 5%. 1000 milliLiter(s) (100 mL/Hr) IV Continuous <Continuous>  dextrose 50% Injectable 25 Gram(s) IV Push once  dextrose 50% Injectable 12.5 Gram(s) IV Push once  dextrose 50% Injectable 25 Gram(s) IV Push once  enoxaparin Injectable 40 milliGRAM(s) SubCutaneous at bedtime  glucagon  Injectable 1 milliGRAM(s) IntraMuscular once  lactated ringers. 1000 milliLiter(s) (100 mL/Hr) IV Continuous <Continuous>  LORazepam   Injectable 1 milliGRAM(s) IV Push once  metroNIDAZOLE  IVPB 500 milliGRAM(s) IV Intermittent every 8 hours  pantoprazole    Tablet 40 milliGRAM(s) Oral before breakfast    MEDICATIONS  (PRN):  morphine  - Injectable 2 milliGRAM(s) IV Push every 6 hours PRN Moderate Pain (4 - 6)  ondansetron Injectable 4 milliGRAM(s) IV Push every 8 hours PRN Nausea and/or Vomiting    DVT PROPHYLAXIS: enoxaparin Injectable 40 milliGRAM(s) SubCutaneous at bedtime  GI PROPHYLAXIS: pantoprazole    Tablet 40 milliGRAM(s) Oral before breakfast  ANTIBIOTICS:  cefTRIAXone   IVPB 2000 milliGRAM(s)  metroNIDAZOLE  IVPB 500 milliGRAM(s)      LAB/STUDIES:  Labs:  CAPILLARY BLOOD GLUCOSE  POCT Blood Glucose.: 204 mg/dL (25 Jan 2022 21:43)                    13.5   5.14  )-----------( 198      ( 25 Jan 2022 20:00 )             40.6       Auto Neutrophil %: 60.2 % (01-25-22 @ 20:00)  Auto Immature Granulocyte %: 0.2 % (01-25-22 @ 20:00)  Auto Neutrophil %: 58.4 % (01-25-22 @ 06:02)  Auto Immature Granulocyte %: 0.2 % (01-25-22 @ 06:02)    01-25  131<L>  |  100  |  13  ----------------------------<  209<H>  4.3   |  22  |  0.8    Calcium, Total Serum: 8.8 mg/dL (01-25-22 @ 20:00)    LFTs:     5.9  | 0.7  | 107      ------------------[161     ( 25 Jan 2022 20:00 )  3.5  | x    | 274            Lactate, Blood: 0.8 mmol/L (01-24-22 @ 06:03)  Lactate, Blood: 0.9 mmol/L (01-23-22 @ 04:30)    Coags:  10.30  ----< 0.89    ( 25 Jan 2022 20:00 )     36.4      Serum Pro-Brain Natriuretic Peptide: 291 pg/mL (01-22-22 @ 19:01)    Culture - Blood (collected 23 Jan 2022 04:30)  Source: .Blood Blood-Arterial  Preliminary Report (24 Jan 2022 16:01):    No growth to date.    TRAUMA TEAM SPECTRA #[id number]    IMAGING:  < from: US Abdomen Upper Quadrant Right (01.23.22 @ 00:49) >  IMPRESSION:  Cholelithiasis and gallbladder sludge without pericholecystic fluid or   gallbladder wall thickening. Reported negative sonographic Grigsby's sign.   Therefore, no sonographic evidence of acute cholecystitis.  Hepatic steatosis.  < end of copied text >

## 2022-01-27 ENCOUNTER — RESULT REVIEW (OUTPATIENT)
Age: 52
End: 2022-01-27

## 2022-01-27 LAB
ALBUMIN SERPL ELPH-MCNC: 3.6 G/DL — SIGNIFICANT CHANGE UP (ref 3.5–5.2)
ALP SERPL-CCNC: 142 U/L — HIGH (ref 30–115)
ALT FLD-CCNC: 247 U/L — HIGH (ref 0–41)
ANION GAP SERPL CALC-SCNC: 10 MMOL/L — SIGNIFICANT CHANGE UP (ref 7–14)
APTT BLD: 35 SEC — SIGNIFICANT CHANGE UP (ref 27–39.2)
AST SERPL-CCNC: 135 U/L — HIGH (ref 0–41)
BASOPHILS # BLD AUTO: 0.03 K/UL — SIGNIFICANT CHANGE UP (ref 0–0.2)
BASOPHILS NFR BLD AUTO: 0.3 % — SIGNIFICANT CHANGE UP (ref 0–1)
BILIRUB DIRECT SERPL-MCNC: 0.3 MG/DL — SIGNIFICANT CHANGE UP (ref 0–0.3)
BILIRUB INDIRECT FLD-MCNC: 0.4 MG/DL — SIGNIFICANT CHANGE UP (ref 0.2–1.2)
BILIRUB SERPL-MCNC: 0.7 MG/DL — SIGNIFICANT CHANGE UP (ref 0.2–1.2)
BUN SERPL-MCNC: 15 MG/DL — SIGNIFICANT CHANGE UP (ref 10–20)
CALCIUM SERPL-MCNC: 8.6 MG/DL — SIGNIFICANT CHANGE UP (ref 8.5–10.1)
CHLORIDE SERPL-SCNC: 104 MMOL/L — SIGNIFICANT CHANGE UP (ref 98–110)
CO2 SERPL-SCNC: 25 MMOL/L — SIGNIFICANT CHANGE UP (ref 17–32)
CREAT SERPL-MCNC: 0.7 MG/DL — SIGNIFICANT CHANGE UP (ref 0.7–1.5)
CULTURE RESULTS: SIGNIFICANT CHANGE UP
CULTURE RESULTS: SIGNIFICANT CHANGE UP
EOSINOPHIL # BLD AUTO: 0.09 K/UL — SIGNIFICANT CHANGE UP (ref 0–0.7)
EOSINOPHIL NFR BLD AUTO: 0.9 % — SIGNIFICANT CHANGE UP (ref 0–8)
GLUCOSE BLDC GLUCOMTR-MCNC: 126 MG/DL — HIGH (ref 70–99)
GLUCOSE BLDC GLUCOMTR-MCNC: 126 MG/DL — HIGH (ref 70–99)
GLUCOSE BLDC GLUCOMTR-MCNC: 139 MG/DL — HIGH (ref 70–99)
GLUCOSE BLDC GLUCOMTR-MCNC: 185 MG/DL — HIGH (ref 70–99)
GLUCOSE SERPL-MCNC: 144 MG/DL — HIGH (ref 70–99)
HCT VFR BLD CALC: 40.4 % — LOW (ref 42–52)
HGB BLD-MCNC: 13.8 G/DL — LOW (ref 14–18)
IMM GRANULOCYTES NFR BLD AUTO: 0.5 % — HIGH (ref 0.1–0.3)
INR BLD: 0.99 RATIO — SIGNIFICANT CHANGE UP (ref 0.65–1.3)
LIDOCAIN IGE QN: 232 U/L — HIGH (ref 7–60)
LYMPHOCYTES # BLD AUTO: 1.21 K/UL — SIGNIFICANT CHANGE UP (ref 1.2–3.4)
LYMPHOCYTES # BLD AUTO: 11.5 % — LOW (ref 20.5–51.1)
MAGNESIUM SERPL-MCNC: 2.2 MG/DL — SIGNIFICANT CHANGE UP (ref 1.8–2.4)
MCHC RBC-ENTMCNC: 30.7 PG — SIGNIFICANT CHANGE UP (ref 27–31)
MCHC RBC-ENTMCNC: 34.2 G/DL — SIGNIFICANT CHANGE UP (ref 32–37)
MCV RBC AUTO: 89.8 FL — SIGNIFICANT CHANGE UP (ref 80–94)
MONOCYTES # BLD AUTO: 0.59 K/UL — SIGNIFICANT CHANGE UP (ref 0.1–0.6)
MONOCYTES NFR BLD AUTO: 5.6 % — SIGNIFICANT CHANGE UP (ref 1.7–9.3)
NEUTROPHILS # BLD AUTO: 8.58 K/UL — HIGH (ref 1.4–6.5)
NEUTROPHILS NFR BLD AUTO: 81.2 % — HIGH (ref 42.2–75.2)
NRBC # BLD: 0 /100 WBCS — SIGNIFICANT CHANGE UP (ref 0–0)
PHOSPHATE SERPL-MCNC: 3.4 MG/DL — SIGNIFICANT CHANGE UP (ref 2.1–4.9)
PLATELET # BLD AUTO: 265 K/UL — SIGNIFICANT CHANGE UP (ref 130–400)
POTASSIUM SERPL-MCNC: 4.5 MMOL/L — SIGNIFICANT CHANGE UP (ref 3.5–5)
POTASSIUM SERPL-SCNC: 4.5 MMOL/L — SIGNIFICANT CHANGE UP (ref 3.5–5)
PROT SERPL-MCNC: 6 G/DL — SIGNIFICANT CHANGE UP (ref 6–8)
PROTHROM AB SERPL-ACNC: 11.4 SEC — SIGNIFICANT CHANGE UP (ref 9.95–12.87)
RBC # BLD: 4.5 M/UL — LOW (ref 4.7–6.1)
RBC # FLD: 12.9 % — SIGNIFICANT CHANGE UP (ref 11.5–14.5)
SODIUM SERPL-SCNC: 139 MMOL/L — SIGNIFICANT CHANGE UP (ref 135–146)
SPECIMEN SOURCE: SIGNIFICANT CHANGE UP
SPECIMEN SOURCE: SIGNIFICANT CHANGE UP
WBC # BLD: 10.55 K/UL — SIGNIFICANT CHANGE UP (ref 4.8–10.8)
WBC # FLD AUTO: 10.55 K/UL — SIGNIFICANT CHANGE UP (ref 4.8–10.8)

## 2022-01-27 PROCEDURE — 88304 TISSUE EXAM BY PATHOLOGIST: CPT | Mod: 26

## 2022-01-27 PROCEDURE — 47562 LAPAROSCOPIC CHOLECYSTECTOMY: CPT

## 2022-01-27 PROCEDURE — 99232 SBSQ HOSP IP/OBS MODERATE 35: CPT

## 2022-01-27 PROCEDURE — 99233 SBSQ HOSP IP/OBS HIGH 50: CPT

## 2022-01-27 RX ORDER — SODIUM CHLORIDE 9 MG/ML
1000 INJECTION, SOLUTION INTRAVENOUS
Refills: 0 | Status: DISCONTINUED | OUTPATIENT
Start: 2022-01-27 | End: 2022-01-27

## 2022-01-27 RX ORDER — CEFTRIAXONE 500 MG/1
1000 INJECTION, POWDER, FOR SOLUTION INTRAMUSCULAR; INTRAVENOUS EVERY 24 HOURS
Refills: 0 | Status: DISCONTINUED | OUTPATIENT
Start: 2022-01-27 | End: 2022-01-28

## 2022-01-27 RX ORDER — OXYCODONE HYDROCHLORIDE 5 MG/1
5 TABLET ORAL ONCE
Refills: 0 | Status: DISCONTINUED | OUTPATIENT
Start: 2022-01-27 | End: 2022-01-27

## 2022-01-27 RX ORDER — CEFTRIAXONE 500 MG/1
1000 INJECTION, POWDER, FOR SOLUTION INTRAMUSCULAR; INTRAVENOUS EVERY 24 HOURS
Refills: 0 | Status: DISCONTINUED | OUTPATIENT
Start: 2022-01-27 | End: 2022-01-27

## 2022-01-27 RX ORDER — METRONIDAZOLE 500 MG
500 TABLET ORAL EVERY 8 HOURS
Refills: 0 | Status: DISCONTINUED | OUTPATIENT
Start: 2022-01-27 | End: 2022-01-27

## 2022-01-27 RX ORDER — ENOXAPARIN SODIUM 100 MG/ML
40 INJECTION SUBCUTANEOUS AT BEDTIME
Refills: 0 | Status: DISCONTINUED | OUTPATIENT
Start: 2022-01-27 | End: 2022-01-28

## 2022-01-27 RX ORDER — ONDANSETRON 8 MG/1
4 TABLET, FILM COATED ORAL ONCE
Refills: 0 | Status: DISCONTINUED | OUTPATIENT
Start: 2022-01-27 | End: 2022-01-27

## 2022-01-27 RX ORDER — MORPHINE SULFATE 50 MG/1
4 CAPSULE, EXTENDED RELEASE ORAL ONCE
Refills: 0 | Status: DISCONTINUED | OUTPATIENT
Start: 2022-01-27 | End: 2022-01-27

## 2022-01-27 RX ORDER — INSULIN LISPRO 100/ML
VIAL (ML) SUBCUTANEOUS
Refills: 0 | Status: DISCONTINUED | OUTPATIENT
Start: 2022-01-27 | End: 2022-01-28

## 2022-01-27 RX ORDER — IBUPROFEN 200 MG
600 TABLET ORAL EVERY 6 HOURS
Refills: 0 | Status: DISCONTINUED | OUTPATIENT
Start: 2022-01-27 | End: 2022-01-28

## 2022-01-27 RX ORDER — OXYCODONE HYDROCHLORIDE 5 MG/1
5 TABLET ORAL EVERY 6 HOURS
Refills: 0 | Status: DISCONTINUED | OUTPATIENT
Start: 2022-01-27 | End: 2022-01-28

## 2022-01-27 RX ORDER — METOCLOPRAMIDE HCL 10 MG
10 TABLET ORAL ONCE
Refills: 0 | Status: DISCONTINUED | OUTPATIENT
Start: 2022-01-27 | End: 2022-01-27

## 2022-01-27 RX ORDER — PANTOPRAZOLE SODIUM 20 MG/1
40 TABLET, DELAYED RELEASE ORAL
Refills: 0 | Status: DISCONTINUED | OUTPATIENT
Start: 2022-01-27 | End: 2022-01-28

## 2022-01-27 RX ORDER — METRONIDAZOLE 500 MG
500 TABLET ORAL EVERY 8 HOURS
Refills: 0 | Status: COMPLETED | OUTPATIENT
Start: 2022-01-27 | End: 2022-01-27

## 2022-01-27 RX ORDER — HYDROMORPHONE HYDROCHLORIDE 2 MG/ML
1 INJECTION INTRAMUSCULAR; INTRAVENOUS; SUBCUTANEOUS
Refills: 0 | Status: DISCONTINUED | OUTPATIENT
Start: 2022-01-27 | End: 2022-01-27

## 2022-01-27 RX ORDER — CHLORHEXIDINE GLUCONATE 213 G/1000ML
1 SOLUTION TOPICAL EVERY 24 HOURS
Refills: 0 | Status: DISCONTINUED | OUTPATIENT
Start: 2022-01-27 | End: 2022-01-28

## 2022-01-27 RX ORDER — ACETAMINOPHEN 500 MG
650 TABLET ORAL EVERY 6 HOURS
Refills: 0 | Status: DISCONTINUED | OUTPATIENT
Start: 2022-01-27 | End: 2022-01-28

## 2022-01-27 RX ORDER — HYDROMORPHONE HYDROCHLORIDE 2 MG/ML
0.5 INJECTION INTRAMUSCULAR; INTRAVENOUS; SUBCUTANEOUS
Refills: 0 | Status: DISCONTINUED | OUTPATIENT
Start: 2022-01-27 | End: 2022-01-27

## 2022-01-27 RX ADMIN — Medication 100 MILLIGRAM(S): at 21:51

## 2022-01-27 RX ADMIN — PANTOPRAZOLE SODIUM 40 MILLIGRAM(S): 20 TABLET, DELAYED RELEASE ORAL at 06:09

## 2022-01-27 RX ADMIN — HYDROMORPHONE HYDROCHLORIDE 0.5 MILLIGRAM(S): 2 INJECTION INTRAMUSCULAR; INTRAVENOUS; SUBCUTANEOUS at 16:40

## 2022-01-27 RX ADMIN — Medication 650 MILLIGRAM(S): at 19:00

## 2022-01-27 RX ADMIN — Medication 650 MILLIGRAM(S): at 18:27

## 2022-01-27 RX ADMIN — Medication 1: at 16:52

## 2022-01-27 RX ADMIN — ENOXAPARIN SODIUM 40 MILLIGRAM(S): 100 INJECTION SUBCUTANEOUS at 21:52

## 2022-01-27 RX ADMIN — MORPHINE SULFATE 4 MILLIGRAM(S): 50 CAPSULE, EXTENDED RELEASE ORAL at 19:00

## 2022-01-27 RX ADMIN — HYDROMORPHONE HYDROCHLORIDE 0.5 MILLIGRAM(S): 2 INJECTION INTRAMUSCULAR; INTRAVENOUS; SUBCUTANEOUS at 17:20

## 2022-01-27 RX ADMIN — MORPHINE SULFATE 2 MILLIGRAM(S): 50 CAPSULE, EXTENDED RELEASE ORAL at 08:34

## 2022-01-27 RX ADMIN — Medication 100 MILLIGRAM(S): at 06:09

## 2022-01-27 RX ADMIN — CHLORHEXIDINE GLUCONATE 1 APPLICATION(S): 213 SOLUTION TOPICAL at 06:32

## 2022-01-27 RX ADMIN — HYDROMORPHONE HYDROCHLORIDE 0.5 MILLIGRAM(S): 2 INJECTION INTRAMUSCULAR; INTRAVENOUS; SUBCUTANEOUS at 17:05

## 2022-01-27 RX ADMIN — CEFTRIAXONE 100 MILLIGRAM(S): 500 INJECTION, POWDER, FOR SOLUTION INTRAMUSCULAR; INTRAVENOUS at 06:10

## 2022-01-27 RX ADMIN — SODIUM CHLORIDE 100 MILLILITER(S): 9 INJECTION, SOLUTION INTRAVENOUS at 16:38

## 2022-01-27 RX ADMIN — HYDROMORPHONE HYDROCHLORIDE 0.5 MILLIGRAM(S): 2 INJECTION INTRAMUSCULAR; INTRAVENOUS; SUBCUTANEOUS at 16:55

## 2022-01-27 NOTE — PROGRESS NOTE ADULT - SUBJECTIVE AND OBJECTIVE BOX
Patient is a 52 y/o male with PMHx of DM, Fatty Liver, recently on testosterone who presented to the ED with abdominal pain. Patient notes pain was initially RUQ, and LUQ, without radiation. Patient feels better since admission. Patient s/p EUS/ERCP. Patient had Duodenal diverticulum and ERCP done with sphincterotomy and removal of sludge. Patient had CBD and PD stent placed. Awaiting surgery today.       PAST MEDICAL & SURGICAL HISTORY:  Pre-DM  Testosterone use      FAMILY HISTORY:  non-contributory    Social History:  Tobacco: denies   Alcohol: Denies  Drugs: denies    Home Medications:    MEDICATIONS  (STANDING):  cefTRIAXone   IVPB 2000 milliGRAM(s) IV Intermittent every 24 hours  chlorhexidine 4% Liquid 1 Application(s) Topical every 24 hours  dextrose 40% Gel 15 Gram(s) Oral once  dextrose 5%. 1000 milliLiter(s) (50 mL/Hr) IV Continuous <Continuous>  dextrose 5%. 1000 milliLiter(s) (100 mL/Hr) IV Continuous <Continuous>  dextrose 50% Injectable 25 Gram(s) IV Push once  dextrose 50% Injectable 12.5 Gram(s) IV Push once  dextrose 50% Injectable 25 Gram(s) IV Push once  enoxaparin Injectable 40 milliGRAM(s) SubCutaneous at bedtime  glucagon  Injectable 1 milliGRAM(s) IntraMuscular once  lactated ringers. 1000 milliLiter(s) (100 mL/Hr) IV Continuous <Continuous>  metroNIDAZOLE    Tablet 500 milliGRAM(s) Oral every 8 hours  pantoprazole    Tablet 40 milliGRAM(s) Oral before breakfast    MEDICATIONS  (PRN):  morphine  - Injectable 2 milliGRAM(s) IV Push every 6 hours PRN Moderate Pain (4 - 6)  ondansetron Injectable 4 milliGRAM(s) IV Push every 8 hours PRN Nausea and/or Vomiting      Allergies  Allergy Status Unknown      Review of Systems:   Constitutional:  No Fever, No Chills  ENT/Mouth:  No Hearing Changes,  No Difficulty Swallowing  Eyes:  No Eye Pain, No Vision Changes  Cardiovascular:  No Chest Pain, No Palpitations  Respiratory:  No Cough, No Dyspnea  Gastrointestinal:  As described in HPI  Musculoskeletal:  No Joint Swelling, No Back Pain  Skin:  No Skin Lesions, No Jaundice  Neuro:  No Syncope, No Dizziness  Heme/Lymph:  No Bruising, No Bleeding.      Vital Signs   T(F): 97.4 (25 Jan 2022 05:39), Max: 97.4 (25 Jan 2022 05:39)  HR: 62 (25 Jan 2022 05:39) (60 - 62)  BP: 138/87 (25 Jan 2022 05:39) (138/79 - 138/87)  RR: 18 (25 Jan 2022 05:39) (18 - 18)  Constitutional: No acute distress.  Eyes:. Conjunctivae are clear, Sclera is icteric.  Ears Nose and Throat: The external ears are normal appearing,  Oral mucosa is pink and moist.  Respiratory:  No signs of respiratory distress. Lung sounds are clear bilaterally.  Cardiovascular:  S1 S2, Regular rate and rhythm.  GI: ND/NT  Neuro: No Tremor, No involuntary movements  Skin: No rashes,  Jaundice.        Labs:                                   13.0   5.07  )-----------( 174      ( 25 Jan 2022 06:02 )             38.3     01-25    136  |  103  |  12  ----------------------------<  229<H>  3.7   |  20  |  0.6<L>    Ca    8.1<L>      25 Jan 2022 06:02  Phos  2.3     01-24  Mg     1.9     01-25    TPro  5.4<L>  /  Alb  3.5  /  TBili  1.1  /  DBili  0.5<H>  /  AST  116<H>  /  ALT  284<H>  /  AlkPhos  155<H>  01-25      Radiology:  US Abdomen Upper Quadrant Right (01.23.22 @ 00:49) >  IMPRESSION:    Cholelithiasis and gallbladder sludge without pericholecystic fluid or   gallbladder wall thickening. Reported negative sonographic Grigsby's sign.   Therefore, no sonographic evidence of acute cholecystitis.    Hepatic steatosis.      CT Abdomen and Pelvis w/ IV Cont 01.22.22   IMPRESSION:    No CT evidence of acute abdominopelvic pathology.    See body of the report for additional findings.

## 2022-01-27 NOTE — PROGRESS NOTE ADULT - ASSESSMENT
ASSESSMENT & PLAN  52 yo male, h/o pre diabetes, not on home medication presented for fever and abdominal pain.     # Acute cholangitis vs acute cholecystitis with DILI  # Septic on admission, resolved  - presented with RUQ abdominal pain, tenderness  - recent hx of anabolic steroid use - can cause cholestatic injury per GI  - LA 5.5 on admission, improved to 1.30  - CT A/P significant for hepatic steatosis, cholelithiasis  - RUQ U/S significant for cholelithiasis, gallbladder sludge  - s/p ID eval - cont ceftriaxone, flagyl can change to PO levofloxacin and flagyl on dc  - s/p GI eval - MRCP, acute hepatitis panel, autoimmune w/u  - MRCP - cholelithiasis w/ signs concerning for acute giuseppe  - pt afebrile, no leukocytosis, HDS, pain has resolved  - s/p ERCP w/ adv GI 1/26 - 2x stents placed, needs rpt in 4-6w  - s/p surgery eval - lap giuseppe today 1/27    # DM2, newly diagnosed  - FS: 126, 183, 147, 135  - a1c 7.0%  - insulin protocol if needed  - FS qac, qhs  - metformin on discharge    PT/REHAB n/a  ACTIVITY: Activity - Ambulate as Tolerated  DVT PPX: enoxaparin Injectable  GI PPX: pantoprazole    Tablet  DIET: Diet, Consistent Carbohydrate w/Evening Snack  CODE: Full code   Disposition: from home; acute  Pending: lap giuseppe today

## 2022-01-27 NOTE — PRE-ANESTHESIA EVALUATION ADULT - NSANTHOSAYNRD_GEN_A_CORE
No. GIORGIO screening performed.  STOP BANG Legend: 0-2 = LOW Risk; 3-4 = INTERMEDIATE Risk; 5-8 = HIGH Risk

## 2022-01-27 NOTE — DIETITIAN INITIAL EVALUATION ADULT. - EDUCATION DIETARY MODIFICATIONS
Benefits/importance of oral nutrition supplements/(1) partially meets; needs review/practice/verbalization

## 2022-01-27 NOTE — DIETITIAN INITIAL EVALUATION ADULT. - OTHER CALCULATIONS
Based on dosing weight: 67 kg (1/27) - surgery, cholecystitis; Energy: 1584 - 1811 kcal/day (MSJ x1.05 - 1.2 SF); Protein: 54 - 67 gm/day (0.8 - 1 gm/kg); Fluids: 2010 - 2345 ml/day (30 - 35 ml/kg)

## 2022-01-27 NOTE — CHART NOTE - NSCHARTNOTEFT_GEN_A_CORE
PACU ANESTHESIA ADMISSION NOTE      Procedure: Laparoscopic cholecystectomy      Post op diagnosis:  Acute calculous cholecystitis        ____  Intubated  TV:______       Rate: ______      FiO2: ______    _x___  Patent Airway    _x___  Full return of protective reflexes    __  Full recovery from anesthesia / back to baseline status    Vitals:  T-98.7  HR: 83  BP:133/7  RR: 18 (01-27-22 @ 14:00) (16 - 18)  SpO2: 99% (01-27-22 @ 12:34) (97% - 97%)    Mental Status:  _x___ Awake   _____ Alert   _____ Drowsy   _____ Sedated    Nausea/Vomiting:  _x___  NO       ______Yes,   See Post - Op Orders         Pain Scale (0-10):  __0___    Treatment: _x___ None    ____ See Post - Op/PCA Orders    Post - Operative Fluids:   __x__ Oral   ____ See Post - Op Orders    Plan: Discharge:   ___Home       ___x__Floor     _____Critical Care    _____  Other:_________________    Comments:  No anesthesia issues or complications noted.  Discharge when criteria met.

## 2022-01-27 NOTE — DIETITIAN INITIAL EVALUATION ADULT. - ORAL NUTRITION SUPPLEMENTS
Prosource Gelatein Sugar Free Three Times a Day (450 kcal, 60 gm Protein) to aid in meeting estimated nutrient needs

## 2022-01-27 NOTE — BRIEF OPERATIVE NOTE - OPERATION/FINDINGS
Laparoscopic cholecystectomy with lysis of peritoneal adhesions. Cystic duct taken with Endo KEISHA; cystic artery clipped. Critical view of safety achieved.

## 2022-01-27 NOTE — DIETITIAN INITIAL EVALUATION ADULT. - OTHER INFO
Ntr hx cont:   Patient had gingerale yesterday, and reports that did not make him feel good. He was having chicken broth and was able to tolerate it. Today he was NPO for lap giuseppe today 1/27    Pertinent Medical Information:  Patient is 52 yo male with PMHx of DM, Fatty Liver, recently on testosterone. He was admitted for abdominal pain and fever.   #Acute cholangitis vs acute cholecystitis with DILI  #Septic on admission - resolved  #DM 2 - newly diagnosed    Patient underwent lap giuseppe 1/27

## 2022-01-27 NOTE — PROGRESS NOTE ADULT - SUBJECTIVE AND OBJECTIVE BOX
GENERAL SURGERY PROGRESS NOTE    Patient: SHIV SIBLEY , 51y ([birth year])Male   MRN: [id number]  Location: [address]  Visit: 01-22-22 Inpatient  Date: 01-27-22 @ 09:50    Hospital Day #: 6  Post-Op Day #: None    Procedure/Dx/Injuries: 51M w/ PMHX pre diabetes, not on home medication presented for fever and abdominal pain. U/S revealing of Gallstones, but no wall thickening or pericholecystic fluid. MRCP performed, no filling defect but some gallbladder wall edema now s/p GI ERCP for  sphincterotomy, 5F X 4cm PD stent placement with sludge removal.    Events of past 24 hours: Patient was pre-oped for OR today. Underwent ERCP for  sphincterotomy, 5F X 4cm PD stent placement with sludge removal with GI 1/26    PAST MEDICAL & SURGICAL HISTORY:  No pertinent past medical history    Vitals:   T(F): 97 (01-27-22 @ 06:39), Max: 97.4 (01-26-22 @ 16:17)  HR: 97 (01-27-22 @ 06:39)  BP: 102/63 (01-27-22 @ 06:39)  RR: 18 (01-27-22 @ 06:39)  SpO2: 98% (01-26-22 @ 15:55)      Diet, Clear Liquid  Diet, NPO after Midnight:      NPO Start Date: 26-Jan-2022,   NPO Start Time: 23:59  Diet, NPO after Midnight:      NPO Start Date: 25-Jan-2022,   NPO Start Time: 23:59  Except Medications      Fluids: sodium chloride 0.9%.: Solution, 1000 milliLiter(s) infuse at 50 mL/Hr      I & O's:    01-26-22 @ 07:01  -  01-27-22 @ 07:00  --------------------------------------------------------  IN:    Oral Fluid: 240 mL  Total IN: 240 mL    OUT:  Total OUT: 0 mL    Total NET: 240 mL    PHYSICAL EXAM:  General: NAD, AAOx3, calm and cooperative  HEENT: NCAT, PATT, EOMI, Trachea ML, Neck supple  Cardiac: RRR S1, S2, no Murmurs, rubs or gallops  Respiratory: CTAB, normal respiratory effort, breath sounds equal BL, no wheeze, rhonchi or crackles  Abdomen: Soft, non-distended, non-tender, no rebound, no guarding. +BS.  Musculoskeletal: Strength 5/5 BL UE/LE, ROM intact, compartments soft  Neuro: Sensation grossly intact and equal throughout, no focal deficits  Vascular: Pulses 2+ throughout, extremities well perfused  Skin: Warm/dry, normal color, no jaundice      MEDICATIONS  (STANDING):  cefTRIAXone   IVPB 2000 milliGRAM(s) IV Intermittent every 24 hours  chlorhexidine 4% Liquid 1 Application(s) Topical every 24 hours  dextrose 40% Gel 15 Gram(s) Oral once  dextrose 5%. 1000 milliLiter(s) (50 mL/Hr) IV Continuous <Continuous>  dextrose 5%. 1000 milliLiter(s) (100 mL/Hr) IV Continuous <Continuous>  dextrose 50% Injectable 25 Gram(s) IV Push once  dextrose 50% Injectable 12.5 Gram(s) IV Push once  dextrose 50% Injectable 25 Gram(s) IV Push once  enoxaparin Injectable 40 milliGRAM(s) SubCutaneous at bedtime  glucagon  Injectable 1 milliGRAM(s) IntraMuscular once  lactated ringers. 1000 milliLiter(s) (100 mL/Hr) IV Continuous <Continuous>  LORazepam   Injectable 1 milliGRAM(s) IV Push once  metroNIDAZOLE  IVPB 500 milliGRAM(s) IV Intermittent every 8 hours  pantoprazole    Tablet 40 milliGRAM(s) Oral before breakfast  sodium chloride 0.9%. 1000 milliLiter(s) (50 mL/Hr) IV Continuous <Continuous>    MEDICATIONS  (PRN):  morphine  - Injectable 2 milliGRAM(s) IV Push every 6 hours PRN Moderate Pain (4 - 6)  ondansetron Injectable 4 milliGRAM(s) IV Push every 8 hours PRN Nausea and/or Vomiting      DVT PROPHYLAXIS: enoxaparin Injectable 40 milliGRAM(s) SubCutaneous at bedtime    GI PROPHYLAXIS: pantoprazole    Tablet 40 milliGRAM(s) Oral before breakfast    ANTICOAGULATION:   ANTIBIOTICS:  cefTRIAXone   IVPB 2000 milliGRAM(s)  metroNIDAZOLE  IVPB 500 milliGRAM(s)            LAB/STUDIES:  Labs:  CAPILLARY BLOOD GLUCOSE      POCT Blood Glucose.: 126 mg/dL (27 Jan 2022 08:02)  POCT Blood Glucose.: 183 mg/dL (26 Jan 2022 21:26)  POCT Blood Glucose.: 147 mg/dL (26 Jan 2022 16:40)  POCT Blood Glucose.: 135 mg/dL (26 Jan 2022 11:20)                          13.8   10.55 )-----------( 265      ( 27 Jan 2022 04:30 )             40.4       Auto Neutrophil %: 81.2 % (01-27-22 @ 04:30)  Auto Immature Granulocyte %: 0.5 % (01-27-22 @ 04:30)    01-26    137  |  103  |  14  ----------------------------<  152<H>  4.3   |  21  |  0.7          LFTs:             6.3  | 0.8  | 157      ------------------[164     ( 26 Jan 2022 04:30 )  3.8  | 0.4  | 286         Lipase:x      Amylase:x             Coags:     11.40  ----< 0.99    ( 27 Jan 2022 04:30 )     x         Serum Pro-Brain Natriuretic Peptide: 291 pg/mL (01-22-22 @ 19:01)    IMAGING:  < from: ERCP (01.26.22 @ 15:50) >  ERCP: Using a duodenoscope.  radiograph was taken. Limited views of the  esophagus, stomach and duodenum were unremarkable. The major papilla was noted  in the second portion of duodenum and was on the rim of a large (5cm  diverticulum). The major papilla was cannulated, using double wire-guided  cannulation, using a Flowcut sphinctertome preloaded with a 0.035 visiglide 260  cm guidewire the common bile duct was cannulated, the wire was seen in the CBD,  cholangiogram was performed confirming location and showed a filling defects in  the CBD, the CBD was mildly dilated and possible filing defect was noted.  Sphincterotomy was performed. A 5F X 4cm PD stent was placed wire guided to  prevent pancreatitis and then wire was removed. Sludge was removed from the bile  duct using a biliary, stone extraction balloon. The duct was then swept multiple  times and was irrigated with sterile water. An occlusion cholangiogram was  performed opacifying the bile duct and the cystic duct. Due to persistent sludge  and debris in effluent, a stent was placed. Over the guidewire in the CBD, a 10F  X 5 cm straight plastic biliary stent was placed in the CBD under fluoroscopy  and appeared in satisfactory position. The scope was then removed and procedure  terminated. Post-procedure xray did not show air underthe diaphragm.  Indomethacin rectally and fluids were given to prevent pancreatitis    < end of copied text >      ACCESS/ DEVICES:  [X] Peripheral IV

## 2022-01-27 NOTE — DIETITIAN INITIAL EVALUATION ADULT. - PERTINENT LABORATORY DATA
1/27: H/H 13.8 / 40.4 (L), Mg 2.2, P 3.4, Na 139, K+ 4.5, Chloride 104, BUN 15, Cr 0.7, Gluc 144 (H), Ca 8.6, alb 3.6, Alk Phos 142 (H), AST/SGOT 135 (H), ALT/SGPT 247 (H) 1/27: H/H 13.8 / 40.4 (L), Mg 2.2, P 3.4, Na 139, K+ 4.5, Chloride 104, BUN 15, Cr 0.7, Gluc 144 (H), Ca 8.6, alb 3.6, Alk Phos 142 (H), AST/SGOT 135 (H), ALT/SGPT 247 (H)    1/23: HgbA1c 7.0 (H)    CAPILLARY BLOOD GLUCOSE      POCT Blood Glucose.: 126 mg/dL (27 Jan 2022 11:03)  POCT Blood Glucose.: 126 mg/dL (27 Jan 2022 08:02)  POCT Blood Glucose.: 183 mg/dL (26 Jan 2022 21:26)  POCT Blood Glucose.: 147 mg/dL (26 Jan 2022 16:40)

## 2022-01-27 NOTE — CHART NOTE - NSCHARTNOTEFT_GEN_A_CORE
Post Operative Note  Patient: SHIV SIBLEY 51y (10-Martin-1970) Male   MRN: [id number]  Location: [address]  Visit: 01-22-22 Inpatient  Date: 01-27-22 @ 19:58    Procedure: Acute calculous cholecystitis    S/P Laparoscopic cholecystectomy    Subjective:   Nausea: no, Vomiting: no, Ambulating: no, Flatus: no  Pain Assessment: Patient is complaining of mild abdominal pain that is appropriate for post-operative course.     Objective:  Vitals: T(F): 96.1 (01-27-22 @ 18:24), Max: 98.7 (01-27-22 @ 12:34)  HR: 70 (01-27-22 @ 18:24)  BP: 113/70 (01-27-22 @ 18:24) (102/63 - 133/77)  RR: 18 (01-27-22 @ 18:24)  SpO2: 96% (01-27-22 @ 18:00)  Vent Settings:     In:   01-26-22 @ 07:01  -  01-27-22 @ 07:00  --------------------------------------------------------  IN: 240 mL    01-27-22 @ 07:01  -  01-27-22 @ 19:58  --------------------------------------------------------  IN: 100 mL      IV Fluids:     Out:   01-26-22 @ 07:01  -  01-27-22 @ 07:00  --------------------------------------------------------  OUT: 0 mL    01-27-22 @ 07:01  -  01-27-22 @ 19:58  --------------------------------------------------------  OUT: 0 mL      EBL:     Voided Urine:   01-26-22 @ 07:01  -  01-27-22 @ 07:00  --------------------------------------------------------  OUT: 0 mL    01-27-22 @ 07:01  -  01-27-22 @ 19:58  --------------------------------------------------------  OUT: 0 mL      Physical Examination:  General Appearance: NAD  HEENT: EOMI, sclera non-icteric.  Heart: RRR  Lungs: CTABL  Abdomen:  Soft, minimally tender, appropriate for post-op course, nondistended. No rigidity, guarding, or rebound tenderness.   MSK/Extremities: Warm & well-perfused. Peripheral pulses intact.  Skin: Warm, dry. No jaundice.   Incisions/Wounds: Dressings in place, clean, dry and intact, no signs of infection/active bleeding/drainage    Medications: [Standing]  acetaminophen     Tablet .. 650 milliGRAM(s) Oral every 6 hours  cefTRIAXone   IVPB 1000 milliGRAM(s) IV Intermittent every 24 hours  chlorhexidine 4% Liquid 1 Application(s) Topical every 24 hours  enoxaparin Injectable 40 milliGRAM(s) SubCutaneous at bedtime  ibuprofen  Tablet. 600 milliGRAM(s) Oral every 6 hours  insulin lispro (ADMELOG) corrective regimen sliding scale   SubCutaneous Before meals and at bedtime  metroNIDAZOLE  IVPB 500 milliGRAM(s) IV Intermittent every 8 hours  oxyCODONE    IR 5 milliGRAM(s) Oral every 6 hours PRN  pantoprazole    Tablet 40 milliGRAM(s) Oral before breakfast    Medications: [PRN]  acetaminophen     Tablet .. 650 milliGRAM(s) Oral every 6 hours  cefTRIAXone   IVPB 1000 milliGRAM(s) IV Intermittent every 24 hours  chlorhexidine 4% Liquid 1 Application(s) Topical every 24 hours  enoxaparin Injectable 40 milliGRAM(s) SubCutaneous at bedtime  ibuprofen  Tablet. 600 milliGRAM(s) Oral every 6 hours  insulin lispro (ADMELOG) corrective regimen sliding scale   SubCutaneous Before meals and at bedtime  metroNIDAZOLE  IVPB 500 milliGRAM(s) IV Intermittent every 8 hours  oxyCODONE    IR 5 milliGRAM(s) Oral every 6 hours PRN  pantoprazole    Tablet 40 milliGRAM(s) Oral before breakfast      DVT PROPHYLAXIS: enoxaparin Injectable 40 milliGRAM(s) SubCutaneous at bedtime    GI PROPHYLAXIS: pantoprazole    Tablet 40 milliGRAM(s) Oral before breakfast    ANTICOAGULATION:   ANTIBIOTICS:  cefTRIAXone   IVPB 1000 milliGRAM(s)  metroNIDAZOLE  IVPB 500 milliGRAM(s)        Labs:                        13.8   10.55 )-----------( 265      ( 27 Jan 2022 04:30 )             40.4     01-27    139  |  104  |  15  ----------------------------<  144<H>  4.5   |  25  |  0.7    Ca    8.6      27 Jan 2022 04:30  Phos  3.4     01-27  Mg     2.2     01-27    TPro  6.0  /  Alb  3.6  /  TBili  0.7  /  DBili  0.3  /  AST  135<H>  /  ALT  247<H>  /  AlkPhos  142<H>  01-27    PT/INR - ( 27 Jan 2022 04:30 )   PT: 11.40 sec;   INR: 0.99 ratio         PTT - ( 27 Jan 2022 04:30 )  PTT:35.0 sec    Imaging:  No post-op imaging studies    Assessment:  51yM s/p laparoscopic cholecystectomy    Plan:  - Monitor vitals  - Monitor post-op labs and replete as necessary  - Monitor for bowel function  - Continue Pain Medications if necessary  - Continue Antibiotics if necessary  - Encourage ambulation as tolerated  - Monitor urine output  - DVT and GI Prophylaxis  - Monitor wound and dressing for changes, redress as needed.    Date/Time: 01-27-22 @ 19:58 Post Operative Note  Patient: SHIV SIBLEY 51y (10-Martin-1970) Male   MRN: [id number]  Location: [address]  Visit: 01-22-22 Inpatient  Date: 01-27-22 @ 19:58    Procedure: Acute calculous cholecystitis    S/P Laparoscopic cholecystectomy    Subjective:   Nausea: no, Vomiting: no, Ambulating: no, Flatus: no  Pain Assessment: Patient is complaining of mild abdominal pain that is appropriate for post-operative course.     Objective:  Vitals: T(F): 96.1 (01-27-22 @ 18:24), Max: 98.7 (01-27-22 @ 12:34)  HR: 70 (01-27-22 @ 18:24)  BP: 113/70 (01-27-22 @ 18:24) (102/63 - 133/77)  RR: 18 (01-27-22 @ 18:24)  SpO2: 96% (01-27-22 @ 18:00)  Vent Settings:     In:   01-26-22 @ 07:01  -  01-27-22 @ 07:00  --------------------------------------------------------  IN: 240 mL    01-27-22 @ 07:01  -  01-27-22 @ 19:58  --------------------------------------------------------  IN: 100 mL      IV Fluids:     Out:   01-26-22 @ 07:01  -  01-27-22 @ 07:00  --------------------------------------------------------  OUT: 0 mL    01-27-22 @ 07:01  -  01-27-22 @ 19:58  --------------------------------------------------------  OUT: 0 mL      EBL:     Voided Urine:   01-26-22 @ 07:01  -  01-27-22 @ 07:00  --------------------------------------------------------  OUT: 0 mL    01-27-22 @ 07:01  -  01-27-22 @ 19:58  --------------------------------------------------------  OUT: 0 mL      Physical Examination:  General Appearance: NAD  HEENT: EOMI, sclera non-icteric.  Heart: RRR  Lungs: CTABL  Abdomen:  Soft, minimally tender, appropriate for post-op course, nondistended. No rigidity, guarding, or rebound tenderness.   MSK/Extremities: Warm & well-perfused. Peripheral pulses intact.  Skin: Warm, dry. No jaundice.   Incisions/Wounds: Dressings in place, clean, dry and intact, no signs of infection/active bleeding/drainage    Medications: [Standing]  acetaminophen     Tablet .. 650 milliGRAM(s) Oral every 6 hours  cefTRIAXone   IVPB 1000 milliGRAM(s) IV Intermittent every 24 hours  chlorhexidine 4% Liquid 1 Application(s) Topical every 24 hours  enoxaparin Injectable 40 milliGRAM(s) SubCutaneous at bedtime  ibuprofen  Tablet. 600 milliGRAM(s) Oral every 6 hours  insulin lispro (ADMELOG) corrective regimen sliding scale   SubCutaneous Before meals and at bedtime  metroNIDAZOLE  IVPB 500 milliGRAM(s) IV Intermittent every 8 hours  oxyCODONE    IR 5 milliGRAM(s) Oral every 6 hours PRN  pantoprazole    Tablet 40 milliGRAM(s) Oral before breakfast    Medications: [PRN]  acetaminophen     Tablet .. 650 milliGRAM(s) Oral every 6 hours  cefTRIAXone   IVPB 1000 milliGRAM(s) IV Intermittent every 24 hours  chlorhexidine 4% Liquid 1 Application(s) Topical every 24 hours  enoxaparin Injectable 40 milliGRAM(s) SubCutaneous at bedtime  ibuprofen  Tablet. 600 milliGRAM(s) Oral every 6 hours  insulin lispro (ADMELOG) corrective regimen sliding scale   SubCutaneous Before meals and at bedtime  metroNIDAZOLE  IVPB 500 milliGRAM(s) IV Intermittent every 8 hours  oxyCODONE    IR 5 milliGRAM(s) Oral every 6 hours PRN  pantoprazole    Tablet 40 milliGRAM(s) Oral before breakfast      DVT PROPHYLAXIS: enoxaparin Injectable 40 milliGRAM(s) SubCutaneous at bedtime    GI PROPHYLAXIS: pantoprazole    Tablet 40 milliGRAM(s) Oral before breakfast    ANTICOAGULATION:   ANTIBIOTICS:  cefTRIAXone   IVPB 1000 milliGRAM(s)  metroNIDAZOLE  IVPB 500 milliGRAM(s)        Labs:                        13.8   10.55 )-----------( 265      ( 27 Jan 2022 04:30 )             40.4     01-27    139  |  104  |  15  ----------------------------<  144<H>  4.5   |  25  |  0.7    Ca    8.6      27 Jan 2022 04:30  Phos  3.4     01-27  Mg     2.2     01-27    TPro  6.0  /  Alb  3.6  /  TBili  0.7  /  DBili  0.3  /  AST  135<H>  /  ALT  247<H>  /  AlkPhos  142<H>  01-27    PT/INR - ( 27 Jan 2022 04:30 )   PT: 11.40 sec;   INR: 0.99 ratio         PTT - ( 27 Jan 2022 04:30 )  PTT:35.0 sec    Imaging:  No post-op imaging studies    Assessment:  51yM s/p laparoscopic cholecystectomy    Plan:  - Monitor vitals  - Monitor post-op labs and replete as necessary  - Monitor for bowel function  - Continue Pain Medications if necessary  - Encourage ambulation as tolerated  - Monitor urine output  - DVT and GI Prophylaxis  - Monitor wound and dressing for changes, redress as needed.    Date/Time: 01-27-22 @ 19:58

## 2022-01-27 NOTE — DIETITIAN INITIAL EVALUATION ADULT. - PERTINENT MEDS FT
MEDICATIONS  (STANDING):  lactated ringers. 1000 milliLiter(s) (100 mL/Hr) IV Continuous <Continuous>

## 2022-01-27 NOTE — PRE-OP CHECKLIST - LAST TOOK
[facility]) Physicians   General & Laparoscopic Surgery  [employer]       HPI:  Malini Luther is a very pleasant 77 y.o. female ESRD on HD requesting PD catheter for home dialysis  Complex abdominal surgical history with exploratory surgery and a kidney bypass surgery        Past Medical History:   Diagnosis Date    Arthritis     CKD (chronic kidney disease)     Diabetes mellitus ([address]     Hyperlipidemia     Hypertension      Past Surgical History:   Procedure Laterality Date    ABDOMINAL EXPLORATION SURGERY      HYSTERECTOMY      KIDNEY SURGERY      bypass     No family history on file. Social History     Tobacco Use    Smoking status: Former Smoker     Last attempt to quit: 2000     Years since quittin.2    Smokeless tobacco: Never Used   Substance Use Topics    Alcohol use: Not on file     I counseled the patient on the importance of not smoking and risks of ETOH.    Allergies   Allergen Reactions    Darvon [Propoxyphene]     Ezetimibe      alopecia  alopecia  alopecia  alopecia  alopecia  alopecia  alopecia      Isradipine Other (See Comments)     Peripheral edema + intractible headaches  Peripheral edema + intractible headaches  Peripheral edema + intractible headaches  Peripheral edema + intractible headaches  Peripheral edema + intractible headaches  Peripheral edema + intractible headaches  Peripheral edema + intractible headaches      Lisinopril      Renal azotemia/insufficiency  Renal azotemia/insufficiency  Renal azotemia/insufficiency  Renal azotemia/insufficiency      Nifedipine Swelling     Experienced swelling in legs and face--after stopping this medication, the symptoms stopped within 24h  Experienced swelling in legs and face--after stopping this medication, the symptoms stopped within 24h      Simvastatin      Hair fell out      Terbinafine Hcl Itching     Vitals:    20 1053   BP: (!) 99/44   Pulse: 71   Weight: 170 lb 9.6 oz (77.4 kg)
cooperative. Non-toxic appearance. No distress. HENT:   Head: Normocephalic and atraumatic. Head is without laceration. Right Ear: External ear normal. No lacerations. No drainage, swelling or tenderness. Left Ear: External ear normal. No lacerations. No drainage, swelling or tenderness. Nose: Nose normal. No nose lacerations or nasal deformity. Mouth/Throat: Uvula is midline, oropharynx is clear and moist and mucous membranes are normal. No oropharyngeal exudate. Eyes: Conjunctivae, EOM and lids are normal. Pupils are equal, round, and reactive to light. Right eye exhibits no discharge. No foreign body present in the right eye. Left eye exhibits no discharge. No foreign body present in the left eye. No scleral icterus. Neck: Trachea normal and normal range of motion. Neck supple. No JVD present. No tracheal tenderness present. Carotid bruit is not present. No rigidity. No tracheal deviation and no edema present. No thyromegaly present. Cardiovascular: Normal rate, regular rhythm, normal heart sounds, intact distal pulses and normal pulses. Pulmonary/Chest: Effort normal and breath sounds normal. No stridor. No respiratory distress. Patient  has no wheezes. Patient has no rales. Patient exhibits no tenderness and no crepitus. Abdominal: Soft. Normal appearance and bowel sounds are normal. Patient exhibits no distension, no abdominal bruit, no ascites and no mass. There is no hepatosplenomegaly. There is no tenderness. There is no rigidity, no rebound, no guarding and no CVA tenderness. No hernia. Hernia confirmed negative in the ventral area. Musculoskeletal: Normal range of motion. Patient exhibits no edema or tenderness. Lymphadenopathy:        Head (right side): No submental, no submandibular, no preauricular, no posterior auricular and no occipital adenopathy present.         Head (left side): No submental, no submandibular, no preauricular, no posterior auricular and no occipital
clears
clears

## 2022-01-27 NOTE — PROGRESS NOTE ADULT - SUBJECTIVE AND OBJECTIVE BOX
SHIV SIBLEY MRN#: [id number]  CODE STATUS: FULL CODE     SUBJECTIVE   Chief complaint: abdominal pain  fever    Currently admitted to medicine with the primary diagnosis of SEPSIS; TRANSAMINITIS      Today is hospital day 5d,   INTERVAL HPI/OVERNIGHT EVENTS: s/p ERCP yesterday    This morning, he is laying in bed comfortably. Denies chest pain, shortness of breath, abdominal pain, nausea, vomiting or changes in bowel habits. He c/o RUQ discomfort this morning.     Urinating and stooling appropriately.    Present Today:           [place] Catheter (x)No/ ()Yes?   Indication:             Central Line (x)No/ ()Yes?  Indication:          IV Fluids (x)No/ ()Yes?  Indication:     OBJECTIVE  PAST MEDICAL & SURGICAL HISTORY  No pertinent past medical history      ALLERGIES:  Allergy Status Unknown    HOME MEDICATIONS:  Home Medications:    MEDICATIONS:  STANDING MEDICATIONS  MEDICATIONS  (STANDING):  cefTRIAXone   IVPB 2000 milliGRAM(s) IV Intermittent every 24 hours  chlorhexidine 4% Liquid 1 Application(s) Topical every 24 hours  dextrose 40% Gel 15 Gram(s) Oral once  dextrose 5%. 1000 milliLiter(s) (50 mL/Hr) IV Continuous <Continuous>  dextrose 5%. 1000 milliLiter(s) (100 mL/Hr) IV Continuous <Continuous>  dextrose 50% Injectable 25 Gram(s) IV Push once  dextrose 50% Injectable 12.5 Gram(s) IV Push once  dextrose 50% Injectable 25 Gram(s) IV Push once  enoxaparin Injectable 40 milliGRAM(s) SubCutaneous at bedtime  glucagon  Injectable 1 milliGRAM(s) IntraMuscular once  lactated ringers. 1000 milliLiter(s) (100 mL/Hr) IV Continuous <Continuous>  LORazepam   Injectable 1 milliGRAM(s) IV Push once  metroNIDAZOLE  IVPB 500 milliGRAM(s) IV Intermittent every 8 hours  pantoprazole    Tablet 40 milliGRAM(s) Oral before breakfast  sodium chloride 0.9%. 1000 milliLiter(s) (50 mL/Hr) IV Continuous <Continuous>    PRN MEDICATIONS  MEDICATIONS  (PRN):  morphine  - Injectable 2 milliGRAM(s) IV Push every 6 hours PRN Moderate Pain (4 - 6)  ondansetron Injectable 4 milliGRAM(s) IV Push every 8 hours PRN Nausea and/or Vomiting      VITAL SIGNS  T(F): 97 (01-27 @ 06:39), Max: 97.4 (01-26 @ 16:17)  HR: 97 (01-27 @ 06:39) (65 - 97)  BP: 102/63 (01-27 @ 06:39) (101/58 - 126/85)  RR: 18 (01-27 @ 06:39) (12 - 23)  SpO2: 98% (01-26 @ 15:55) (95% - 99%)    LABS:                        14.1   5.95  )-----------( 214      ( 26 Jan 2022 04:30 )             41.9     01-26    137  |  103  |  14  ----------------------------<  152<H>  4.3   |  21  |  0.7    Ca    8.4<L>      26 Jan 2022 04:30  Mg     2.2     01-26    TPro  6.3  /  Alb  3.8  /  TBili  0.8  /  DBili  0.4<H>  /  AST  157<H>  /  ALT  286<H>  /  AlkPhos  164<H>  01-26    PT/INR - ( 25 Jan 2022 20:00 )   PT: 10.30 sec;   INR: 0.89 ratio         PTT - ( 25 Jan 2022 20:00 )  PTT:36.4 sec      RADIOLOGY:   Reviewed  CXR neg  CT A/P cholelithiasis, hepatic steatosis  RUQ US cholelithiasis, GB sludge  MRCP cholelithiasis, findings concerning for acute giuseppe      PHYSICAL EXAM:  General: Comfortably laying on the hospital bed. NAD. AAOx3.  HEENT: Atraumatic. Normocephalic. EOMI. Conjunctiva and sclera clear.  Cardiac: Normal S1, S2. RRR. No murmurs, rubs, or gallops.  Pulm: CTA b/l no wheezes, rhonchi, or rales.  GI: Soft, RUQ mildly tender to palpation, nondistended. BSx4q  Ext: 2+ pulses. Moving all 4 extremities. No edema, cyanosis.  Neuro: CN II-XII grossly intact  Skin: No lesions or rashes

## 2022-01-27 NOTE — DIETITIAN INITIAL EVALUATION ADULT. - ORAL INTAKE PTA/DIET HISTORY
Patient reported good appetite and po intake PTA. He just eats when hungry. He was taking supplemental testosterone. He reports chronic diarrhea. He feels that weight has not changed much.  lbs. He is lactose intolerant, with allergies to peanuts, shrimp, tomatoes.

## 2022-01-27 NOTE — PROGRESS NOTE ADULT - ASSESSMENT
Patient is a 52 y/o male with PMHx of DM, Fatty Liver, recently on testosterone who presented to the ED with abdominal pain. Patient notes pain was initially RUQ, and LUQ, without radiation. Patient feels better since admission. Patient s/p EUS/ERCP. Patient had Duodenal diverticulum and ERCP done with sphincterotomy and removal of sludge. Patient had CBD and PD stent placed. Awaiting surgery today.       Fever with jaundice/ MRI without CBD stone/ S/P ERCP with removal of sludge and CBD/PD stent placement   - Afebrile   - no leukocytosis  - Going to OR  - Needs ERCP in 4-6 weeks for stent removal- Office given his information and will arrange outpatient follow up

## 2022-01-27 NOTE — PROGRESS NOTE ADULT - ASSESSMENT
ASSESSMENT:  51M w/ PMHX pre diabetes, not on home medication presented for fever and abdominal pain. U/S revealing of Gallstones, but no wall thickening or pericholecystic fluid. MRCP performed, no filling defect but some gallbladder wall edema now s/p GI ERCP for  sphincterotomy, 5F X 4cm PD stent placement with sludge removal.    PLAN:  - OR today for: Laparoscopic cholecystectomy, possible open, possible intraoperative cholangiogram  - Consented  - IVF, NPO  - Pain Management  - Strict Ins and Out  - K>4, MG>2, Phos>3    Lines/Tubes: PIV    TRAUMA SPECTRA: [id number]

## 2022-01-27 NOTE — DIETITIAN INITIAL EVALUATION ADULT. - RD TO REMAIN AVAILABLE
Interventions: meals and snacks,  medical food supplement, vitamins and minerals, coordination of care; Monitoring and Evaluation: diet order, weight, labs (see above), skin status, NFPF/yes

## 2022-01-27 NOTE — DIETITIAN INITIAL EVALUATION ADULT. - ADD RECOMMEND
1) As medically feasible, advance diet to Consistent CHO clear liquids to start and then advance to solid food: Consistency CHO, low fat diet 2) Recommend Prosource Gelatein Sugar Free TID; Patient is at high nutrition risk; RD to f/u in 4 days; DM education to be provided at f/u.

## 2022-01-28 ENCOUNTER — TRANSCRIPTION ENCOUNTER (OUTPATIENT)
Age: 52
End: 2022-01-28

## 2022-01-28 VITALS — DIASTOLIC BLOOD PRESSURE: 80 MMHG | RESPIRATION RATE: 18 BRPM | SYSTOLIC BLOOD PRESSURE: 119 MMHG | HEART RATE: 71 BPM

## 2022-01-28 PROBLEM — Z78.9 OTHER SPECIFIED HEALTH STATUS: Chronic | Status: ACTIVE | Noted: 2022-01-22

## 2022-01-28 LAB
ALBUMIN SERPL ELPH-MCNC: 3.7 G/DL — SIGNIFICANT CHANGE UP (ref 3.5–5.2)
ALP SERPL-CCNC: 140 U/L — HIGH (ref 30–115)
ALT FLD-CCNC: 217 U/L — HIGH (ref 0–41)
ANION GAP SERPL CALC-SCNC: 15 MMOL/L — HIGH (ref 7–14)
AST SERPL-CCNC: 100 U/L — HIGH (ref 0–41)
BASOPHILS # BLD AUTO: 0.02 K/UL — SIGNIFICANT CHANGE UP (ref 0–0.2)
BASOPHILS NFR BLD AUTO: 0.1 % — SIGNIFICANT CHANGE UP (ref 0–1)
BILIRUB DIRECT SERPL-MCNC: 0.3 MG/DL — SIGNIFICANT CHANGE UP (ref 0–0.3)
BILIRUB INDIRECT FLD-MCNC: 0.5 MG/DL — SIGNIFICANT CHANGE UP (ref 0.2–1.2)
BILIRUB SERPL-MCNC: 0.8 MG/DL — SIGNIFICANT CHANGE UP (ref 0.2–1.2)
BUN SERPL-MCNC: 17 MG/DL — SIGNIFICANT CHANGE UP (ref 10–20)
CALCIUM SERPL-MCNC: 8.5 MG/DL — SIGNIFICANT CHANGE UP (ref 8.5–10.1)
CHLORIDE SERPL-SCNC: 102 MMOL/L — SIGNIFICANT CHANGE UP (ref 98–110)
CO2 SERPL-SCNC: 19 MMOL/L — SIGNIFICANT CHANGE UP (ref 17–32)
CREAT SERPL-MCNC: 0.6 MG/DL — LOW (ref 0.7–1.5)
CULTURE RESULTS: SIGNIFICANT CHANGE UP
EOSINOPHIL # BLD AUTO: 0 K/UL — SIGNIFICANT CHANGE UP (ref 0–0.7)
EOSINOPHIL NFR BLD AUTO: 0 % — SIGNIFICANT CHANGE UP (ref 0–8)
GLUCOSE BLDC GLUCOMTR-MCNC: 123 MG/DL — HIGH (ref 70–99)
GLUCOSE BLDC GLUCOMTR-MCNC: 194 MG/DL — HIGH (ref 70–99)
GLUCOSE SERPL-MCNC: 156 MG/DL — HIGH (ref 70–99)
HCT VFR BLD CALC: 41.4 % — LOW (ref 42–52)
HGB BLD-MCNC: 13.6 G/DL — LOW (ref 14–18)
IMM GRANULOCYTES NFR BLD AUTO: 0.4 % — HIGH (ref 0.1–0.3)
LYMPHOCYTES # BLD AUTO: 0.77 K/UL — LOW (ref 1.2–3.4)
LYMPHOCYTES # BLD AUTO: 5.8 % — LOW (ref 20.5–51.1)
MAGNESIUM SERPL-MCNC: 2.1 MG/DL — SIGNIFICANT CHANGE UP (ref 1.8–2.4)
MCHC RBC-ENTMCNC: 29.8 PG — SIGNIFICANT CHANGE UP (ref 27–31)
MCHC RBC-ENTMCNC: 32.9 G/DL — SIGNIFICANT CHANGE UP (ref 32–37)
MCV RBC AUTO: 90.8 FL — SIGNIFICANT CHANGE UP (ref 80–94)
MONOCYTES # BLD AUTO: 0.45 K/UL — SIGNIFICANT CHANGE UP (ref 0.1–0.6)
MONOCYTES NFR BLD AUTO: 3.4 % — SIGNIFICANT CHANGE UP (ref 1.7–9.3)
NEUTROPHILS # BLD AUTO: 12.08 K/UL — HIGH (ref 1.4–6.5)
NEUTROPHILS NFR BLD AUTO: 90.3 % — HIGH (ref 42.2–75.2)
NRBC # BLD: 0 /100 WBCS — SIGNIFICANT CHANGE UP (ref 0–0)
PHOSPHATE SERPL-MCNC: 4 MG/DL — SIGNIFICANT CHANGE UP (ref 2.1–4.9)
PLATELET # BLD AUTO: 317 K/UL — SIGNIFICANT CHANGE UP (ref 130–400)
POTASSIUM SERPL-MCNC: 4.6 MMOL/L — SIGNIFICANT CHANGE UP (ref 3.5–5)
POTASSIUM SERPL-SCNC: 4.6 MMOL/L — SIGNIFICANT CHANGE UP (ref 3.5–5)
PROT SERPL-MCNC: 5.7 G/DL — LOW (ref 6–8)
RBC # BLD: 4.56 M/UL — LOW (ref 4.7–6.1)
RBC # FLD: 13 % — SIGNIFICANT CHANGE UP (ref 11.5–14.5)
SODIUM SERPL-SCNC: 136 MMOL/L — SIGNIFICANT CHANGE UP (ref 135–146)
SPECIMEN SOURCE: SIGNIFICANT CHANGE UP
SURGICAL PATHOLOGY STUDY: SIGNIFICANT CHANGE UP
WBC # BLD: 13.38 K/UL — HIGH (ref 4.8–10.8)
WBC # FLD AUTO: 13.38 K/UL — HIGH (ref 4.8–10.8)

## 2022-01-28 PROCEDURE — 99024 POSTOP FOLLOW-UP VISIT: CPT

## 2022-01-28 RX ORDER — IBUPROFEN 200 MG
1 TABLET ORAL
Qty: 0 | Refills: 0 | DISCHARGE
Start: 2022-01-28

## 2022-01-28 RX ORDER — ACETAMINOPHEN 500 MG
2 TABLET ORAL
Qty: 0 | Refills: 0 | DISCHARGE
Start: 2022-01-28

## 2022-01-28 RX ADMIN — Medication 600 MILLIGRAM(S): at 01:00

## 2022-01-28 RX ADMIN — OXYCODONE HYDROCHLORIDE 5 MILLIGRAM(S): 5 TABLET ORAL at 03:37

## 2022-01-28 RX ADMIN — Medication 1: at 11:20

## 2022-01-28 RX ADMIN — Medication 600 MILLIGRAM(S): at 11:20

## 2022-01-28 RX ADMIN — Medication 650 MILLIGRAM(S): at 00:59

## 2022-01-28 RX ADMIN — Medication 650 MILLIGRAM(S): at 11:19

## 2022-01-28 RX ADMIN — CHLORHEXIDINE GLUCONATE 1 APPLICATION(S): 213 SOLUTION TOPICAL at 06:22

## 2022-01-28 RX ADMIN — PANTOPRAZOLE SODIUM 40 MILLIGRAM(S): 20 TABLET, DELAYED RELEASE ORAL at 05:35

## 2022-01-28 RX ADMIN — Medication 650 MILLIGRAM(S): at 05:33

## 2022-01-28 RX ADMIN — Medication 600 MILLIGRAM(S): at 06:11

## 2022-01-28 NOTE — PROGRESS NOTE ADULT - TIME BILLING
patient care.
patient's care
patient's care
Counseled patient about diagnostic testing and treatment plan. All questions answered.

## 2022-01-28 NOTE — PROVIDER CONTACT NOTE (OTHER) - SITUATION
patient called RN in the room and is complaining of dizziness whenever he is closing his eyes. /68 HR 60. He states this went on all night as well.

## 2022-01-28 NOTE — DISCHARGE NOTE PROVIDER - NSDCMRMEDTOKEN_GEN_ALL_CORE_FT
Detail Level: Detailed acetaminophen 325 mg oral tablet: 2 tab(s) orally every 6 hours  ibuprofen 600 mg oral tablet: 1 tab(s) orally every 6 hours

## 2022-01-28 NOTE — PROGRESS NOTE ADULT - REASON FOR ADMISSION
abdominal pain  fever

## 2022-01-28 NOTE — PROGRESS NOTE ADULT - SUBJECTIVE AND OBJECTIVE BOX
GENERAL SURGERY PROGRESS NOTE    Patient: SHIV SIBLEY , 51y ([birth year])Male   MRN: [id number]  Location: [address]  Visit: 01-22-22 Inpatient  Date: 01-28-22 @ 00:46    Hospital Day #: 7  Post-Op Day #: 1    Procedure/Dx/Injuries:  51M w/ PMHX pre diabetes, not on home medication presented for fever and abdominal pain. U/S revealing of Gallstones, but no wall thickening or pericholecystic fluid. MRCP performed, no filling defect but some gallbladder wall edema now s/p GI ERCP for  sphincterotomy, 5F X 4cm PD stent placement with sludge removal and s/p laparoscopic cholecystectomy    Events of past 24 hours: Patient underwent laparoscopic cholecystectomy, HD stable, minimal pain, no nausea/vomiting/fevers/chills    PAST MEDICAL & SURGICAL HISTORY:  No pertinent past medical history    Vitals:   T(F): 96.1 (01-27-22 @ 18:24), Max: 98.7 (01-27-22 @ 12:34)  HR: 70 (01-27-22 @ 18:24)  BP: 113/70 (01-27-22 @ 18:24)  RR: 18 (01-27-22 @ 18:24)  SpO2: 96% (01-27-22 @ 18:00)      Diet, Consistent Carbohydrate w/Evening Snack      Fluids:     I & O's:    01-26-22 @ 07:01  -  01-27-22 @ 07:00  --------------------------------------------------------  IN:    Oral Fluid: 240 mL  Total IN: 240 mL    OUT:  Total OUT: 0 mL    Total NET: 240 mL    Bowel Movement: : [] YES [x] NO  Flatus: : [] YES [x] NO    PHYSICAL EXAM:  General: NAD  HEENT: EOMI, Trachea ML, Neck supple  Cardiac: RRR  Respiratory: normal respiratory effort, breath sounds equal BL, no wheeze, rhonchi or crackles  Abdomen: Soft, non-distended, minimally tender, no rebound, no guarding   Musculoskeletal: Strength 5/5 BL UE/LE, ROM intact, compartments soft  Neuro: Sensation grossly intact and equal throughout, no focal deficits  Vascular: Pulses 2+ throughout, extremities well perfused  Skin: Warm/dry, normal color, no jaundice    MEDICATIONS  (STANDING):  acetaminophen     Tablet .. 650 milliGRAM(s) Oral every 6 hours  cefTRIAXone   IVPB 1000 milliGRAM(s) IV Intermittent every 24 hours  chlorhexidine 4% Liquid 1 Application(s) Topical every 24 hours  enoxaparin Injectable 40 milliGRAM(s) SubCutaneous at bedtime  ibuprofen  Tablet. 600 milliGRAM(s) Oral every 6 hours  insulin lispro (ADMELOG) corrective regimen sliding scale   SubCutaneous Before meals and at bedtime  pantoprazole    Tablet 40 milliGRAM(s) Oral before breakfast    MEDICATIONS  (PRN):  oxyCODONE    IR 5 milliGRAM(s) Oral every 6 hours PRN Severe Pain (7 - 10)      DVT PROPHYLAXIS: enoxaparin Injectable 40 milliGRAM(s) SubCutaneous at bedtime    GI PROPHYLAXIS: pantoprazole    Tablet 40 milliGRAM(s) Oral before breakfast    ANTICOAGULATION:   ANTIBIOTICS:  cefTRIAXone   IVPB 1000 milliGRAM(s)            LAB/STUDIES:  Labs:  CAPILLARY BLOOD GLUCOSE  185 (27 Jan 2022 16:50)      POCT Blood Glucose.: 139 mg/dL (27 Jan 2022 22:02)  POCT Blood Glucose.: 185 mg/dL (27 Jan 2022 16:48)  POCT Blood Glucose.: 126 mg/dL (27 Jan 2022 11:03)  POCT Blood Glucose.: 126 mg/dL (27 Jan 2022 08:02)                          13.6   13.38 )-----------( 317      ( 27 Jan 2022 20:00 )             41.4       Auto Neutrophil %: 90.3 % (01-27-22 @ 20:00)  Auto Immature Granulocyte %: 0.4 % (01-27-22 @ 20:00)  Auto Neutrophil %: 81.2 % (01-27-22 @ 04:30)  Auto Immature Granulocyte %: 0.5 % (01-27-22 @ 04:30)    01-27    136  |  102  |  17  ----------------------------<  156<H>  4.6   |  x   |  0.6<L>      Calcium, Total Serum: 8.5 mg/dL (01-27-22 @ 20:00)      LFTs:             5.7  | 0.8  | 100      ------------------[140     ( 27 Jan 2022 20:00 )  3.7  | 0.3  | 217         Lipase:x      Amylase:x             Coags:     11.40  ----< 0.99    ( 27 Jan 2022 04:30 )     35.0      Serum Pro-Brain Natriuretic Peptide: 291 pg/mL (01-22-22 @ 19:01)    ACCESS/ DEVICES:  [x] Peripheral IV

## 2022-01-28 NOTE — PROGRESS NOTE ADULT - PROVIDER SPECIALTY LIST ADULT
Gastroenterology
Internal Medicine
Surgery
Gastroenterology
Hospitalist
Hospitalist
Internal Medicine
Internal Medicine
Surgery
Gastroenterology
Internal Medicine
Surgery
Infectious Disease
DISPLAY PLAN FREE TEXT

## 2022-01-28 NOTE — DISCHARGE NOTE PROVIDER - CARE PROVIDER_API CALL
Yeison Bond)  Surgery; Surgical Critical Care  [address] [facility], [facility], [address] [place] [id number]  Phone: [phone]  Fax: [phone]  Follow Up Time:     Vicky Bustillo)  Gastroenterology; Internal Medicine  [address] [place] [id number]  Phone: [phone]  Fax: [phone]  Follow Up Time:

## 2022-01-28 NOTE — DISCHARGE NOTE NURSING/CASE MANAGEMENT/SOCIAL WORK - NSDCPEFALRISK_GEN_ALL_CORE
For information on Fall & Injury Prevention, visit: [email or web address] OR  [email or web address] OR  https://www.cdc.gov/steadi/patient.html

## 2022-01-28 NOTE — PROGRESS NOTE ADULT - ATTENDING COMMENTS
50 yo male, h/o pre diabetes, not on home medication presented for fever and abdominal pain.     # Acute cholangitis vs acute cholecystitis vs DILI  # Septic on admission  - presented with RUQ abdominal pain, tenderness  - recent hx of anabolic steroid use - can cause cholestatic injury per GI  - LA 5.5 on admission, improved to 1.30  - CT A/P significant for hepatic steatosis, cholelithiasis  - RUQ U/S significant for cholelithiasis, gallbladder sludge  - s/p ID eval - cont ceftriaxone, flagyl can change to PO levofloxacin and flagyl on dc  - s/p GI eval - MRCP, acute hepatitis panel, autoimmune w/u  - MRCP - cholelithiasis w/ signs concerning for acute giuseppe  - plan for ERCP today , plan for lap giuseppe tomorrow     # DM2, newly diagnosed  - a1c 7.0%  - insulin protocol if needed  - metformin on discharge      DVT PPX: enoxaparin     #Progress Note Handoff:  Pending (specify): improvement in cholangitis, OR tomorrow   Disposition: Home___/SNF___/Other________/Unknown at this time___x_____      Shakira Foley, DO
50 yo male, h/o pre diabetes, not on home medication presented for fever and abdominal pain.     # Acute cholangitis vs acute cholecystitis vs DILI  # Septic on admission  - presented with RUQ abdominal pain, tenderness  - recent hx of anabolic steroid use - can cause cholestatic injury per GI  - LA 5.5 on admission, improved to 1.30  - CT A/P significant for hepatic steatosis, cholelithiasis  - RUQ U/S significant for cholelithiasis, gallbladder sludge  - s/p ID eval - cont ceftriaxone, flagyl can change to PO levofloxacin and flagyl on dc  - s/p GI eval - MRCP, acute hepatitis panel, autoimmune w/u  - MRCP - cholelithiasis w/ signs concerning for acute giuseppe  - s/p ERCP on 1/26 w/ sphincterotomy and stent placed   - plan for Lap Giuseppe today     # DM2, newly diagnosed  - a1c 7.0%  - insulin protocol if needed  - metformin on discharge      DVT PPX: enoxaparin     #Progress Note Handoff:  Pending (specify): lap giuseppe today, dc 24-48   Disposition: Home___/SNF___/Other________/Unknown at this time___x_____      Shakira Foley DO .
Intermediate likelihood for choledocholithiasis, in the setting of gallstone disease. Despite no filling defects on MRCP, may consider EUS +/- ERCP for CBD stones as requested by surgery
Patient sen and examined on the GI-Advanced endoscopy rounds , case discussed with the medicine team, assessment and plan above, will follow up as outpatient for ERCP and stent removal
Patient had ERCP performed yesterday that showed CBD stones and sludge - sphincterotomy was performed and stent was placed.    Abdomen: soft, mildly tender in the epigastrium and RUQ.    Assessment/Plan:  Scheduled for today for Laparoscopic, Possible Open Cholecystectomy, Possible Intraoperative Cholangiogram.    Informed consent was obtained from the patient for the above procedure after explaining all the risks and benefits of it including but not limited to infection, bleeding and etc. He understood and agreed. All questions were answered.
Patient is clinically stable, afebrile.  Tolerates low fat diet.    Needs to follow with me as outpatient in 2 weeks.  Needs to follow with GI for removal of CBD stent in 4 weeks.
Patient seen and examined ON 1/24/22, case discussed with GI–advance endoscopy rounds with the GI fellow and GI advanced endoscopy PA, assessment and plan as above, patient with cholecystitis possible cholangitis,  plan for EUS r/o CBD stone, will follow and arrange for EUS
Follow EUS/ERCP.  Patient scheduled for tomorrow for Laparoscopic, Possible Open Cholecystectomy.  Keep NPO after MN  Follow labs with LFTs today.    Informed consent was obtained from the patient for  Laparoscopic, Possible Open Cholecystectomy after explaining all the risks and benefits of the procedure including but not limited to infection, bleeding and etc. He understood and agreed. All questions were answered.

## 2022-01-28 NOTE — DISCHARGE NOTE PROVIDER - HOSPITAL COURSE
This is a 52yo male with PMH of DM, fatty liver, recently on testosterone who presented to the ED with abdominal pain on 1/22/2022. Patient received an US which revealed cholelithiasis with no wall thickening or pericholecystic fluid. Upon admission patient had a TBili of 3.5, Alk Phos of 226, AST of 482 and ALT of 671. ID was consulted and the patient was started on rocephin 2g IV q24hr and flagyl 500mg IV q8h. GI was consulted and MRCP was done which revealed no filling defect but some gallbladder wall edema. Surgery was consulted and planned for laparoscopic cholecystectomy this admission following EUS/ERCP. EGD showed nonerosive gastritis s/p biopsies, EUS showed CBD with possible stones PD 5.2mm. ERCP was performed with sphincterotomy and PD stent with sludge removal on 1/26/2022. On 1/27/2022 patient underwent laparoscopic cholecystectomy without complications. Patients LFTs downtrended over his hospital stay. His diet was advanced and he is tolerating it well. Patient is ambulating, voiding, and passing gas. Patient was instructed to follow up with Dr. Bond and Dr. Angelo Lugo in 2 weeks. Patient is stable and ready for discharge.

## 2022-01-28 NOTE — DISCHARGE NOTE NURSING/CASE MANAGEMENT/SOCIAL WORK - PATIENT PORTAL LINK FT
You can access the FollowMyHealth Patient Portal offered by [facility] by registering at the following website: [email or web address]. By joining [facility]’s FollowMyHealth portal, you will also be able to view your health information using other applications (apps) compatible with our system.

## 2022-01-28 NOTE — DISCHARGE NOTE PROVIDER - NSDCCPCAREPLAN_GEN_ALL_CORE_FT
PRINCIPAL DISCHARGE DIAGNOSIS  Diagnosis: S/P cholecystectomy  Assessment and Plan of Treatment: Activity: No heavy lifting > 10 lbs for 2 weeks. Avoid straining or excessive activity x 6 weeks.   Dressings: Remove outer dressings in 48 hours and steri strips underneath will fall off on their own. Do not scrub wounds. You may shower but do not bathe. May use ice packs for pain and swelling.   Pain control: You may take over-the-counter tylenol and motrin three times per day with food for up to 3 days.   Follow up: Please call the number provided to make an appointment with Dr. Bond and GI Dr. Angelo Lugo in 2 weeks. Please call with any questions or concerns including fevers, worsening pain, pus from the wounds, or redness of the skin.  You may resume work after your 2 week office visit with Dr. Bond.      SECONDARY DISCHARGE DIAGNOSES  Diagnosis: Transaminitis  Assessment and Plan of Treatment:

## 2022-01-31 LAB — SURGICAL PATHOLOGY STUDY: SIGNIFICANT CHANGE UP

## 2022-02-09 ENCOUNTER — APPOINTMENT (OUTPATIENT)
Dept: GASTROENTEROLOGY | Facility: CLINIC | Age: 52
End: 2022-02-09
Payer: MEDICAID

## 2022-02-09 PROCEDURE — 99443: CPT

## 2022-02-09 NOTE — HISTORY OF PRESENT ILLNESS
[Home] : at home, [unfilled] , at the time of the visit. [Other Location: e.g. Home (Enter Location, City,State)___] : at [unfilled] [Verbal consent obtained from patient] : the patient, [unfilled] [FreeTextEntry4] : Jennifer [de-identified] : 50 yo M hx of cholelithiasis and choledocholithiasis SP ERCP (double wire with placement of PD and CBD stent). Pt is SP CCY. No complaints\par previous colonoscopy >5 years ago unaware about findings

## 2022-02-09 NOTE — ASSESSMENT
[FreeTextEntry1] : 52 yo M Hx of cholelithiasis/choledocholithiasis\par SP ERCP + PD CBD stents\par ?unclear CRC screening status\par \par Plan:\par Schedule ERCP for stent removal\par Discussed colonoscopy- Will discuss with wife  and decide.

## 2022-02-10 DIAGNOSIS — K29.60 OTHER GASTRITIS WITHOUT BLEEDING: ICD-10-CM

## 2022-02-10 DIAGNOSIS — F40.240 CLAUSTROPHOBIA: ICD-10-CM

## 2022-02-10 DIAGNOSIS — R74.01 ELEVATION OF LEVELS OF LIVER TRANSAMINASE LEVELS: ICD-10-CM

## 2022-02-10 DIAGNOSIS — K57.10 DIVERTICULOSIS OF SMALL INTESTINE WITHOUT PERFORATION OR ABSCESS WITHOUT BLEEDING: ICD-10-CM

## 2022-02-10 DIAGNOSIS — K86.89 OTHER SPECIFIED DISEASES OF PANCREAS: ICD-10-CM

## 2022-02-10 DIAGNOSIS — A41.9 SEPSIS, UNSPECIFIED ORGANISM: ICD-10-CM

## 2022-02-10 DIAGNOSIS — K80.62 CALCULUS OF GALLBLADDER AND BILE DUCT WITH ACUTE CHOLECYSTITIS WITHOUT OBSTRUCTION: ICD-10-CM

## 2022-02-10 DIAGNOSIS — E11.9 TYPE 2 DIABETES MELLITUS WITHOUT COMPLICATIONS: ICD-10-CM

## 2022-02-10 DIAGNOSIS — K76.0 FATTY (CHANGE OF) LIVER, NOT ELSEWHERE CLASSIFIED: ICD-10-CM

## 2022-02-10 DIAGNOSIS — K65.9 PERITONITIS, UNSPECIFIED: ICD-10-CM

## 2022-02-14 ENCOUNTER — APPOINTMENT (OUTPATIENT)
Dept: SURGERY | Facility: CLINIC | Age: 52
End: 2022-02-14
Payer: MEDICAID

## 2022-02-14 VITALS
WEIGHT: 170 LBS | TEMPERATURE: 96.7 F | OXYGEN SATURATION: 97 % | DIASTOLIC BLOOD PRESSURE: 80 MMHG | SYSTOLIC BLOOD PRESSURE: 108 MMHG | HEART RATE: 81 BPM

## 2022-02-14 PROCEDURE — 99024 POSTOP FOLLOW-UP VISIT: CPT

## 2022-02-18 NOTE — PLAN
[FreeTextEntry1] : Pathology report was reviewed and shows acute on chronic cholecystitis this was discussed with the patient.\par Advised to follow with GI for CBD stent removal.\par Avoid heavy lifting x 8 weeks.\par Followup with me as needed

## 2022-02-18 NOTE — HISTORY OF PRESENT ILLNESS
[de-identified] : This is 52 y/o male who had Laparoscopic Cholecystectomy on 01/27/2022.\par He also had ERCP and CBD stent placed by GI preoperatively during the same admission. [de-identified] : Patient is doing well, tolerates po diet, has regular bowel function.\par Wounds are well healed.

## 2022-02-18 NOTE — PHYSICAL EXAM
[JVD] : no jugular venous distention  [Normal Thyroid] : the thyroid was normal [Normal Breath Sounds] : Normal breath sounds [Normal Heart Sounds] : normal heart sounds [Normal Rate and Rhythm] : normal rate and rhythm [Abdominal Masses] : No abdominal masses [Abdomen Tenderness] : ~T ~M No abdominal tenderness [Tender] : was nontender [Enlarged] : not enlarged [No Rash or Lesion] : No rash or lesion [Alert] : alert [Oriented to Person] : oriented to person [Oriented to Place] : oriented to place [Oriented to Time] : oriented to time [Calm] : calm [de-identified] : comfortable [de-identified] : KAMALA [de-identified] : supple [de-identified] : soft, nontender [de-identified] : wnl

## 2022-02-27 ENCOUNTER — LABORATORY RESULT (OUTPATIENT)
Age: 52
End: 2022-02-27

## 2022-03-02 ENCOUNTER — OUTPATIENT (OUTPATIENT)
Dept: OUTPATIENT SERVICES | Facility: HOSPITAL | Age: 52
LOS: 1 days | Discharge: HOME | End: 2022-03-02
Payer: COMMERCIAL

## 2022-03-02 ENCOUNTER — TRANSCRIPTION ENCOUNTER (OUTPATIENT)
Age: 52
End: 2022-03-02

## 2022-03-02 VITALS
WEIGHT: 169.98 LBS | TEMPERATURE: 97 F | HEART RATE: 83 BPM | RESPIRATION RATE: 18 BRPM | DIASTOLIC BLOOD PRESSURE: 84 MMHG | SYSTOLIC BLOOD PRESSURE: 142 MMHG | HEIGHT: 69 IN

## 2022-03-02 VITALS
HEART RATE: 66 BPM | SYSTOLIC BLOOD PRESSURE: 144 MMHG | DIASTOLIC BLOOD PRESSURE: 93 MMHG | OXYGEN SATURATION: 100 % | TEMPERATURE: 97 F | RESPIRATION RATE: 16 BRPM

## 2022-03-02 PROCEDURE — 43275 ERCP REMOVE FORGN BODY DUCT: CPT

## 2022-03-02 PROCEDURE — 74328 X-RAY BILE DUCT ENDOSCOPY: CPT | Mod: 26

## 2022-03-02 PROCEDURE — 43264 ERCP REMOVE DUCT CALCULI: CPT | Mod: XU

## 2022-03-02 RX ORDER — SODIUM CHLORIDE 9 MG/ML
1000 INJECTION, SOLUTION INTRAVENOUS ONCE
Refills: 0 | Status: COMPLETED | OUTPATIENT
Start: 2022-03-02 | End: 2022-03-02

## 2022-03-02 RX ADMIN — SODIUM CHLORIDE 1000 MILLILITER(S): 9 INJECTION, SOLUTION INTRAVENOUS at 10:30

## 2022-03-02 NOTE — ASU DISCHARGE PLAN (ADULT/PEDIATRIC) - CARE PROVIDER_API CALL
Vicky Bustillo)  Gastroenterology; Internal Medicine  [address] [place] [id number]  Phone: [phone]  Fax: [phone]  Follow Up Time:

## 2022-03-02 NOTE — H&P PST ADULT - HISTORY OF PRESENT ILLNESS
patient with history of CBD and pancreatic stent S/P ERCP for choledocholithiasis is here  for ERCP with stent removal

## 2022-03-02 NOTE — ASU DISCHARGE PLAN (ADULT/PEDIATRIC) - NS MD DC FALL RISK RISK
For information on Fall & Injury Prevention, visit: [email or web address] OR  [email or web address] OR  https://www.cdc.gov/steadi/patient.html

## 2022-03-02 NOTE — PRE-ANESTHESIA EVALUATION ADULT - NSANTHRISKNONERD_GEN_ALL_CORE
Spoke with mom and Dr. Ackerman regarding labs from today which are good. Plan to follow up with either Dr. Ackerman or myself in one month. Mom and I discussed consideration of referral to pool therapy to help with cardio fitness as he is having more pain with weight bearing activities. Referral sent to PT at [facility]. Kate Maldonado MD on 2/26/2021 at 2:50 PM    No risk alerts present

## 2022-03-02 NOTE — ASU PATIENT PROFILE, ADULT - FALL HARM RISK - UNIVERSAL INTERVENTIONS
Bed in lowest position, wheels locked, appropriate side rails in place/Call bell, personal items and telephone in reach/Instruct patient to call for assistance before getting out of bed or chair/Non-slip footwear when patient is out of bed/[place] to call system/Physically safe environment - no spills, clutter or unnecessary equipment/Purposeful Proactive Rounding/Room/bathroom lighting operational, light cord in reach

## 2022-03-02 NOTE — CHART NOTE - NSCHARTNOTEFT_GEN_A_CORE
PACU ANESTHESIA ADMISSION NOTE      Procedure: ERCP sten removal  Post op diagnosis:      ____  Intubated  TV:______       Rate: ______      FiO2: ______    __x__  Patent Airway    __x__  Full return of protective reflexes    __x__  Full recovery from anesthesia / back to baseline status    Vitals:  T(C): 36 (03-02-22 @ 10:04), Max: 36 (03-02-22 @ 08:24)  HR: 66 (03-02-22 @ 10:14) (66 - 83)  BP: 142/95 (03-02-22 @ 10:14) (127/87 - 142/95)  RR: 16 (03-02-22 @ 10:14) (16 - 18)  SpO2: 99% (03-02-22 @ 10:14) (98% - 99%)    Mental Status:  __x__ Awake   ___x__ Alert   _____ Drowsy   _____ Sedated    Nausea/Vomiting:  __x__ NO  ______Yes,   See Post - Op Orders          Pain Scale (0-10):  _____    Treatment: ____ None    __x__ See Post - Op/PCA Orders    Post - Operative Fluids:   ____ Oral   __x__ See Post - Op Orders    Plan: Discharge:   _x___Home       _____Floor     _____Critical Care    _____  Other:_________________    Comments: Patient had smooth intraoperative event, no anesthesia complication.  PACU Vital signs: HR:            BP:        /          RR:             O2 Sat:       %     Temp

## 2022-03-08 DIAGNOSIS — K80.20 CALCULUS OF GALLBLADDER WITHOUT CHOLECYSTITIS WITHOUT OBSTRUCTION: ICD-10-CM

## 2022-03-21 ENCOUNTER — APPOINTMENT (OUTPATIENT)
Dept: GASTROENTEROLOGY | Facility: CLINIC | Age: 52
End: 2022-03-21
Payer: MEDICAID

## 2022-03-21 ENCOUNTER — APPOINTMENT (OUTPATIENT)
Dept: GASTROENTEROLOGY | Facility: CLINIC | Age: 52
End: 2022-03-21

## 2022-03-21 DIAGNOSIS — K80.50 CALCULUS OF BILE DUCT W/OUT CHOLANGITIS OR CHOLECYSTITIS W/OUT OBSTRUCTION: ICD-10-CM

## 2022-03-21 DIAGNOSIS — Z00.00 ENCOUNTER FOR GENERAL ADULT MEDICAL EXAMINATION W/OUT ABNORMAL FINDINGS: ICD-10-CM

## 2022-03-21 PROCEDURE — 99443: CPT

## 2022-03-21 NOTE — ASSESSMENT
[FreeTextEntry1] : 52 yo M Hx of cholelithiasis/choledocholithiasis\par SP ERCP + PD CBD stents\par ?unclear CRC screening status\par \par Plan:\par No complaints\par PD stent not found (distal migration) on ERCP\par CRC colonoscopy offered again: Will consider it but not decided at the moment. Will plan for may and call the office\par

## 2022-03-21 NOTE — REASON FOR VISIT
[Follow-Up: _____] : a [unfilled] follow-up visit [Consultation] : a consultation visit [FreeTextEntry1] : NP - POST ERCP

## 2022-03-21 NOTE — HISTORY OF PRESENT ILLNESS
[Home] : at home, [unfilled] , at the time of the visit. [Verbal consent obtained from patient] : the patient, [unfilled] [___ Month(s) Ago] : [unfilled] month(s) ago [Test: ___] : [unfilled] [_________] : Performed [unfilled] [Other Location: e.g. Home (Enter Location, City,State)___] : at [unfilled] [FreeTextEntry4] : Jennifer [de-identified] : SP ERCP: ERCP Panc duct stent had migrated.\par No complaints [de-identified] : 50 yo M hx of cholelithiasis and choledocholithiasis SP ERCP (double wire with placement of PD and CBD stent). Pt is SP CCY. No complaints\par previous colonoscopy >5 years ago unaware about findings\par

## 2025-02-07 ENCOUNTER — NON-APPOINTMENT (OUTPATIENT)
Age: 55
End: 2025-02-07

## 2025-06-23 ENCOUNTER — NON-APPOINTMENT (OUTPATIENT)
Age: 55
End: 2025-06-23

## 2025-06-30 ENCOUNTER — NON-APPOINTMENT (OUTPATIENT)
Age: 55
End: 2025-06-30

## 2025-09-12 ENCOUNTER — NON-APPOINTMENT (OUTPATIENT)
Age: 55
End: 2025-09-12

## 2025-09-13 ENCOUNTER — NON-APPOINTMENT (OUTPATIENT)
Age: 55
End: 2025-09-13

## 2025-09-13 ENCOUNTER — APPOINTMENT (OUTPATIENT)
Dept: ORTHOPEDIC SURGERY | Facility: CLINIC | Age: 55
End: 2025-09-13

## 2025-09-13 VITALS — BODY MASS INDEX: 22.96 KG/M2 | HEIGHT: 69 IN | WEIGHT: 155 LBS

## 2025-09-13 DIAGNOSIS — S62.336A DISPLACED FRACTURE OF NECK OF FIFTH METACARPAL BONE, RIGHT HAND, INITIAL ENCOUNTER FOR CLOSED FRACTURE: ICD-10-CM

## 2025-09-13 PROCEDURE — 20550 NJX 1 TENDON SHEATH/LIGAMENT: CPT

## 2025-09-13 PROCEDURE — 29075 APPL CST ELBW FNGR SHORT ARM: CPT | Mod: RT

## 2025-09-13 PROCEDURE — 99203 OFFICE O/P NEW LOW 30 MIN: CPT

## 2025-09-13 PROCEDURE — 73130 X-RAY EXAM OF HAND: CPT | Mod: RT

## 2025-09-13 RX ORDER — IBUPROFEN 800 MG/1
800 TABLET ORAL 3 TIMES DAILY
Qty: 60 | Refills: 1 | Status: ACTIVE | COMMUNITY
Start: 2025-09-13 | End: 1900-01-01